# Patient Record
Sex: FEMALE | Race: WHITE | NOT HISPANIC OR LATINO | Employment: UNEMPLOYED | URBAN - METROPOLITAN AREA
[De-identification: names, ages, dates, MRNs, and addresses within clinical notes are randomized per-mention and may not be internally consistent; named-entity substitution may affect disease eponyms.]

---

## 2017-03-01 ENCOUNTER — OFFICE VISIT (OUTPATIENT)
Dept: RETAIL CLINIC | Facility: CLINIC | Age: 32
End: 2017-03-01

## 2017-03-01 VITALS
DIASTOLIC BLOOD PRESSURE: 72 MMHG | HEART RATE: 85 BPM | OXYGEN SATURATION: 97 % | HEIGHT: 64 IN | TEMPERATURE: 98.3 F | BODY MASS INDEX: 40.97 KG/M2 | SYSTOLIC BLOOD PRESSURE: 112 MMHG | WEIGHT: 240 LBS

## 2017-03-01 DIAGNOSIS — J01.00 ACUTE MAXILLARY SINUSITIS, RECURRENCE NOT SPECIFIED: Primary | ICD-10-CM

## 2017-03-01 PROCEDURE — 99213 OFFICE O/P EST LOW 20 MIN: CPT | Performed by: NURSE PRACTITIONER

## 2017-03-01 RX ORDER — AMOXICILLIN 500 MG/1
1000 CAPSULE ORAL 2 TIMES DAILY
Qty: 40 CAPSULE | Refills: 0 | Status: SHIPPED | OUTPATIENT
Start: 2017-03-01 | End: 2017-10-02 | Stop reason: HOSPADM

## 2017-03-01 NOTE — PROGRESS NOTES
Subjective   Temitope Moreno is a 31 y.o. female.     HPI Comments: 2 week history stuffy, runny nose--at first clear nasal d/c, now green; moderate coughing; c/o severe pain in right maxillary area; denies ear pain, fever; c/o mild nausea this morning, but she is also 9 wks 3 d pregnant.    Has taken Zyrtec and Tylenol along with her prenatal vitamins.  Has used warm compresses on face with minimal relief;        The following portions of the patient's history were reviewed and updated as appropriate: allergies, current medications, past family history, past medical history, past social history, past surgical history and problem list.    Review of Systems   Constitutional: Positive for fatigue. Negative for appetite change, chills and fever.   HENT: Positive for congestion, postnasal drip, rhinorrhea and sinus pressure. Negative for ear pain and sore throat.    Respiratory: Positive for cough.    Skin: Negative for rash.   Neurological: Negative for dizziness and light-headedness.       Objective   Physical Exam   Constitutional: She is oriented to person, place, and time. She appears well-developed and well-nourished.   HENT:   Head: Normocephalic and atraumatic.   Right Ear: Tympanic membrane is not erythematous and not bulging. A middle ear effusion is present.   Left Ear: Tympanic membrane is not erythematous and not bulging. A middle ear effusion (mild clear, light reflex present) is present.   Nose: Mucosal edema present. Right sinus exhibits maxillary sinus tenderness (severe tenderness).   Mouth/Throat: Uvula is midline. Posterior oropharyngeal erythema (mild with PND visible) present.   Cardiovascular: Normal rate, regular rhythm and normal heart sounds.    Pulmonary/Chest: Effort normal and breath sounds normal.   Lymphadenopathy:     She has cervical adenopathy (mild nontender bilat. ant. cervical).   Neurological: She is alert and oriented to person, place, and time.   Skin: Skin is warm, dry and intact.        Assessment/Plan   Temitope was seen today for sinusitis.    Diagnoses and all orders for this visit:    Acute maxillary sinusitis, recurrence not specified  -     amoxicillin (AMOXIL) 500 MG capsule; Take 2 capsules by mouth 2 (Two) Times a Day.

## 2017-03-01 NOTE — PATIENT INSTRUCTIONS

## 2017-09-06 ENCOUNTER — LAB REQUISITION (OUTPATIENT)
Dept: LAB | Facility: HOSPITAL | Age: 32
End: 2017-09-06

## 2017-09-06 DIAGNOSIS — Z34.03 ENCOUNTER FOR SUPERVISION OF NORMAL FIRST PREGNANCY IN THIRD TRIMESTER: ICD-10-CM

## 2017-09-06 PROCEDURE — 87081 CULTURE SCREEN ONLY: CPT | Performed by: OBSTETRICS & GYNECOLOGY

## 2017-09-09 LAB — BACTERIA SPEC AEROBE CULT: NORMAL

## 2017-09-30 ENCOUNTER — HOSPITAL ENCOUNTER (INPATIENT)
Facility: HOSPITAL | Age: 32
LOS: 2 days | Discharge: HOME | End: 2017-10-02
Attending: OBSTETRICS & GYNECOLOGY | Admitting: OBSTETRICS & GYNECOLOGY

## 2017-09-30 PROBLEM — Z34.90 PREGNANCY: Status: ACTIVE | Noted: 2017-09-30

## 2017-09-30 LAB
ABO GROUP BLD: NORMAL
AMPHET+METHAMPHET UR QL: NEGATIVE
AMPHETAMINES UR QL: NEGATIVE
BARBITURATES UR QL SCN: NEGATIVE
BENZODIAZ UR QL SCN: NEGATIVE
BLD GP AB SCN SERPL QL: NEGATIVE
BUPRENORPHINE SERPL-MCNC: NEGATIVE NG/ML
CANNABINOIDS SERPL QL: NEGATIVE
COCAINE UR QL: NEGATIVE
DEPRECATED RDW RBC AUTO: 44 FL (ref 37–54)
ERYTHROCYTE [DISTWIDTH] IN BLOOD BY AUTOMATED COUNT: 13.5 % (ref 11.3–14.5)
HCT VFR BLD AUTO: 41.2 % (ref 34.5–44)
HGB BLD-MCNC: 14.3 G/DL (ref 11.5–15.5)
MCH RBC QN AUTO: 30.8 PG (ref 27–31)
MCHC RBC AUTO-ENTMCNC: 34.7 G/DL (ref 32–36)
MCV RBC AUTO: 88.8 FL (ref 80–99)
METHADONE UR QL SCN: NEGATIVE
OPIATES UR QL: NEGATIVE
OXYCODONE UR QL SCN: NEGATIVE
PCP UR QL SCN: NEGATIVE
PLATELET # BLD AUTO: 234 10*3/MM3 (ref 150–450)
PMV BLD AUTO: 10.4 FL (ref 6–12)
PROPOXYPH UR QL: NEGATIVE
RBC # BLD AUTO: 4.64 10*6/MM3 (ref 3.89–5.14)
RH BLD: POSITIVE
TRICYCLICS UR QL SCN: NEGATIVE
WBC NRBC COR # BLD: 11.7 10*3/MM3 (ref 3.5–10.8)

## 2017-09-30 PROCEDURE — 36415 COLL VENOUS BLD VENIPUNCTURE: CPT | Performed by: OBSTETRICS & GYNECOLOGY

## 2017-09-30 PROCEDURE — 86901 BLOOD TYPING SEROLOGIC RH(D): CPT | Performed by: OBSTETRICS & GYNECOLOGY

## 2017-09-30 PROCEDURE — 85027 COMPLETE CBC AUTOMATED: CPT | Performed by: OBSTETRICS & GYNECOLOGY

## 2017-09-30 PROCEDURE — 25010000002 ONDANSETRON PER 1 MG: Performed by: OBSTETRICS & GYNECOLOGY

## 2017-09-30 PROCEDURE — 86850 RBC ANTIBODY SCREEN: CPT | Performed by: OBSTETRICS & GYNECOLOGY

## 2017-09-30 PROCEDURE — 86900 BLOOD TYPING SEROLOGIC ABO: CPT | Performed by: OBSTETRICS & GYNECOLOGY

## 2017-09-30 PROCEDURE — 80306 DRUG TEST PRSMV INSTRMNT: CPT | Performed by: OBSTETRICS & GYNECOLOGY

## 2017-09-30 PROCEDURE — 0KQM0ZZ REPAIR PERINEUM MUSCLE, OPEN APPROACH: ICD-10-PCS | Performed by: OBSTETRICS & GYNECOLOGY

## 2017-09-30 RX ORDER — CARBOPROST TROMETHAMINE 250 UG/ML
250 INJECTION, SOLUTION INTRAMUSCULAR AS NEEDED
Status: DISCONTINUED | OUTPATIENT
Start: 2017-09-30 | End: 2017-09-30 | Stop reason: HOSPADM

## 2017-09-30 RX ORDER — BISACODYL 10 MG
10 SUPPOSITORY, RECTAL RECTAL DAILY PRN
Status: DISCONTINUED | OUTPATIENT
Start: 2017-10-01 | End: 2017-10-02 | Stop reason: HOSPADM

## 2017-09-30 RX ORDER — PROMETHAZINE HYDROCHLORIDE 25 MG/1
25 TABLET ORAL EVERY 6 HOURS PRN
Status: DISCONTINUED | OUTPATIENT
Start: 2017-09-30 | End: 2017-10-02 | Stop reason: HOSPADM

## 2017-09-30 RX ORDER — SODIUM CHLORIDE 0.9 % (FLUSH) 0.9 %
1-10 SYRINGE (ML) INJECTION AS NEEDED
Status: DISCONTINUED | OUTPATIENT
Start: 2017-09-30 | End: 2017-09-30 | Stop reason: HOSPADM

## 2017-09-30 RX ORDER — ONDANSETRON 2 MG/ML
4 INJECTION INTRAMUSCULAR; INTRAVENOUS EVERY 6 HOURS PRN
Status: DISCONTINUED | OUTPATIENT
Start: 2017-09-30 | End: 2017-10-02 | Stop reason: HOSPADM

## 2017-09-30 RX ORDER — LANOLIN 100 %
OINTMENT (GRAM) TOPICAL AS NEEDED
Status: DISCONTINUED | OUTPATIENT
Start: 2017-09-30 | End: 2017-10-02 | Stop reason: HOSPADM

## 2017-09-30 RX ORDER — SERTRALINE HYDROCHLORIDE 25 MG/1
25 TABLET, FILM COATED ORAL DAILY
COMMUNITY
End: 2020-10-12 | Stop reason: SDUPTHER

## 2017-09-30 RX ORDER — MISOPROSTOL 200 UG/1
800 TABLET ORAL AS NEEDED
Status: DISCONTINUED | OUTPATIENT
Start: 2017-09-30 | End: 2017-09-30 | Stop reason: HOSPADM

## 2017-09-30 RX ORDER — OXYTOCIN/RINGER'S LACTATE 20/1000 ML
125 PLASTIC BAG, INJECTION (ML) INTRAVENOUS CONTINUOUS PRN
Status: DISCONTINUED | OUTPATIENT
Start: 2017-09-30 | End: 2017-09-30 | Stop reason: HOSPADM

## 2017-09-30 RX ORDER — SERTRALINE HYDROCHLORIDE 25 MG/1
25 TABLET, FILM COATED ORAL NIGHTLY
Status: DISCONTINUED | OUTPATIENT
Start: 2017-09-30 | End: 2017-10-02 | Stop reason: HOSPADM

## 2017-09-30 RX ORDER — IBUPROFEN 600 MG/1
600 TABLET ORAL EVERY 6 HOURS PRN
Status: DISCONTINUED | OUTPATIENT
Start: 2017-09-30 | End: 2017-10-02 | Stop reason: HOSPADM

## 2017-09-30 RX ORDER — ZOLPIDEM TARTRATE 5 MG/1
5 TABLET ORAL NIGHTLY PRN
Status: DISCONTINUED | OUTPATIENT
Start: 2017-09-30 | End: 2017-10-02 | Stop reason: HOSPADM

## 2017-09-30 RX ORDER — CETIRIZINE HYDROCHLORIDE 5 MG/1
5 TABLET ORAL DAILY
COMMUNITY
End: 2022-09-18

## 2017-09-30 RX ORDER — DOCUSATE SODIUM 100 MG/1
100 CAPSULE, LIQUID FILLED ORAL 2 TIMES DAILY
Status: DISCONTINUED | OUTPATIENT
Start: 2017-09-30 | End: 2017-10-02 | Stop reason: HOSPADM

## 2017-09-30 RX ORDER — DOCUSATE SODIUM 100 MG/1
100 CAPSULE, LIQUID FILLED ORAL 2 TIMES DAILY
COMMUNITY
End: 2017-10-02 | Stop reason: HOSPADM

## 2017-09-30 RX ORDER — SODIUM CHLORIDE, SODIUM LACTATE, POTASSIUM CHLORIDE, CALCIUM CHLORIDE 600; 310; 30; 20 MG/100ML; MG/100ML; MG/100ML; MG/100ML
125 INJECTION, SOLUTION INTRAVENOUS CONTINUOUS
Status: DISCONTINUED | OUTPATIENT
Start: 2017-09-30 | End: 2017-10-02 | Stop reason: HOSPADM

## 2017-09-30 RX ORDER — HYDROCODONE BITARTRATE AND ACETAMINOPHEN 5; 325 MG/1; MG/1
1 TABLET ORAL EVERY 4 HOURS PRN
Status: DISCONTINUED | OUTPATIENT
Start: 2017-09-30 | End: 2017-10-02 | Stop reason: HOSPADM

## 2017-09-30 RX ORDER — METHYLERGONOVINE MALEATE 0.2 MG/ML
200 INJECTION INTRAVENOUS ONCE AS NEEDED
Status: DISCONTINUED | OUTPATIENT
Start: 2017-09-30 | End: 2017-09-30 | Stop reason: HOSPADM

## 2017-09-30 RX ORDER — OXYTOCIN/RINGER'S LACTATE 20/1000 ML
999 PLASTIC BAG, INJECTION (ML) INTRAVENOUS ONCE
Status: COMPLETED | OUTPATIENT
Start: 2017-09-30 | End: 2017-09-30

## 2017-09-30 RX ADMIN — WITCH HAZEL 1 PAD: 500 SOLUTION RECTAL; TOPICAL at 06:44

## 2017-09-30 RX ADMIN — Medication: at 06:43

## 2017-09-30 RX ADMIN — Medication 125 ML/HR: at 04:37

## 2017-09-30 RX ADMIN — BENZOCAINE AND MENTHOL: 20; .5 SPRAY TOPICAL at 06:43

## 2017-09-30 RX ADMIN — Medication 999 ML/HR: at 03:37

## 2017-09-30 RX ADMIN — ONDANSETRON 4 MG: 2 INJECTION INTRAMUSCULAR; INTRAVENOUS at 02:21

## 2017-09-30 RX ADMIN — IBUPROFEN 600 MG: 600 TABLET, FILM COATED ORAL at 05:50

## 2017-09-30 RX ADMIN — IBUPROFEN 600 MG: 600 TABLET, FILM COATED ORAL at 18:03

## 2017-09-30 RX ADMIN — IBUPROFEN 600 MG: 600 TABLET, FILM COATED ORAL at 23:56

## 2017-09-30 RX ADMIN — IBUPROFEN 600 MG: 600 TABLET, FILM COATED ORAL at 12:19

## 2017-09-30 RX ADMIN — MEASLES, MUMPS, AND RUBELLA VIRUS VACCINE LIVE 0.5 ML: 1000; 12500; 1000 INJECTION, POWDER, LYOPHILIZED, FOR SUSPENSION SUBCUTANEOUS at 05:50

## 2017-09-30 RX ADMIN — SERTRALINE HYDROCHLORIDE 25 MG: 25 TABLET ORAL at 21:09

## 2017-09-30 RX ADMIN — DOCUSATE SODIUM 100 MG: 100 CAPSULE, LIQUID FILLED ORAL at 18:03

## 2017-10-01 LAB
BASOPHILS # BLD AUTO: 0.03 10*3/MM3 (ref 0–0.2)
BASOPHILS NFR BLD AUTO: 0.3 % (ref 0–1)
DEPRECATED RDW RBC AUTO: 46.6 FL (ref 37–54)
EOSINOPHIL # BLD AUTO: 0.17 10*3/MM3 (ref 0–0.3)
EOSINOPHIL NFR BLD AUTO: 1.6 % (ref 0–3)
ERYTHROCYTE [DISTWIDTH] IN BLOOD BY AUTOMATED COUNT: 13.9 % (ref 11.3–14.5)
HCT VFR BLD AUTO: 38 % (ref 34.5–44)
HGB BLD-MCNC: 12.5 G/DL (ref 11.5–15.5)
IMM GRANULOCYTES # BLD: 0.03 10*3/MM3 (ref 0–0.03)
IMM GRANULOCYTES NFR BLD: 0.3 % (ref 0–0.6)
LYMPHOCYTES # BLD AUTO: 3.01 10*3/MM3 (ref 0.6–4.8)
LYMPHOCYTES NFR BLD AUTO: 28.2 % (ref 24–44)
MCH RBC QN AUTO: 30.5 PG (ref 27–31)
MCHC RBC AUTO-ENTMCNC: 32.9 G/DL (ref 32–36)
MCV RBC AUTO: 92.7 FL (ref 80–99)
MONOCYTES # BLD AUTO: 0.81 10*3/MM3 (ref 0–1)
MONOCYTES NFR BLD AUTO: 7.6 % (ref 0–12)
NEUTROPHILS # BLD AUTO: 6.62 10*3/MM3 (ref 1.5–8.3)
NEUTROPHILS NFR BLD AUTO: 62 % (ref 41–71)
PLATELET # BLD AUTO: 206 10*3/MM3 (ref 150–450)
PMV BLD AUTO: 10.7 FL (ref 6–12)
RBC # BLD AUTO: 4.1 10*6/MM3 (ref 3.89–5.14)
WBC NRBC COR # BLD: 10.67 10*3/MM3 (ref 3.5–10.8)

## 2017-10-01 PROCEDURE — 85025 COMPLETE CBC W/AUTO DIFF WBC: CPT | Performed by: OBSTETRICS & GYNECOLOGY

## 2017-10-01 RX ADMIN — IBUPROFEN 600 MG: 600 TABLET, FILM COATED ORAL at 13:58

## 2017-10-01 RX ADMIN — DOCUSATE SODIUM 100 MG: 100 CAPSULE, LIQUID FILLED ORAL at 17:42

## 2017-10-01 RX ADMIN — IBUPROFEN 600 MG: 600 TABLET, FILM COATED ORAL at 05:51

## 2017-10-01 RX ADMIN — SERTRALINE HYDROCHLORIDE 25 MG: 25 TABLET ORAL at 22:59

## 2017-10-01 RX ADMIN — DOCUSATE SODIUM 100 MG: 100 CAPSULE, LIQUID FILLED ORAL at 07:54

## 2017-10-01 NOTE — PROGRESS NOTES
10/1/2017  PPD #1    Subjective   Temitope feels well.  Patient describes her lochia less than menses.  Pain is well controlled       Objective   Temp: Temp:  [97.8 °F (36.6 °C)-98.1 °F (36.7 °C)] 97.8 °F (36.6 °C) Temp src: Oral   BP: BP: (110-137)/(65-77) 135/77        Pulse: Heart Rate:  [63-68] 63  RR: Resp:  [16-18] 18    General:  No acute distress   Abdomen: Fundus firm and beneath umbilicus   Pelvis: deferred     Lab Results   Component Value Date    WBC 10.67 10/01/2017    HGB 12.5 10/01/2017    HCT 38.0 10/01/2017    MCV 92.7 10/01/2017     10/01/2017    ABORH AB Rh Positive 01/20/2015    RUBELLAIGGIN Immune 06/11/2014    HEPBSAG NonReactive 06/11/2014       Assessment  1. PPD# 1 after vaginal delivery    Plan  1. Routine postpartum care.  2.   Desires circ.    This note has been electronically signed.    Carly Garcia MD  October 1, 2017

## 2017-10-01 NOTE — PLAN OF CARE
Problem: Patient Care Overview (Adult)  Goal: Plan of Care Review  Outcome: Ongoing (interventions implemented as appropriate)    10/01/17 0507   Coping/Psychosocial Response Interventions   Plan Of Care Reviewed With patient   Patient Care Overview   Progress improving       Goal: Adult Individualization and Mutuality  Outcome: Ongoing (interventions implemented as appropriate)  Goal: Discharge Needs Assessment  Outcome: Ongoing (interventions implemented as appropriate)    Problem: Breastfeeding (Adult,NICU,,Obstetrics,Pediatric)  Goal: Signs and Symptoms of Listed Potential Problems Will be Absent or Manageable (Breastfeeding)  Outcome: Ongoing (interventions implemented as appropriate)    10/01/17 0507   Breastfeeding   Problems Assessed (Breastfeeding) all   Problems Present (Breastfeeding) none

## 2017-10-02 VITALS
TEMPERATURE: 98.1 F | RESPIRATION RATE: 18 BRPM | WEIGHT: 267 LBS | HEART RATE: 74 BPM | BODY MASS INDEX: 45.58 KG/M2 | SYSTOLIC BLOOD PRESSURE: 127 MMHG | DIASTOLIC BLOOD PRESSURE: 70 MMHG | HEIGHT: 64 IN

## 2017-10-02 RX ORDER — IBUPROFEN 600 MG/1
600 TABLET ORAL EVERY 6 HOURS PRN
Qty: 30 TABLET | Refills: 0 | Status: SHIPPED | OUTPATIENT
Start: 2017-10-02 | End: 2020-10-12

## 2017-10-02 RX ADMIN — WITCH HAZEL 1 PAD: 500 SOLUTION RECTAL; TOPICAL at 03:25

## 2017-10-02 RX ADMIN — IBUPROFEN 600 MG: 600 TABLET, FILM COATED ORAL at 03:25

## 2017-10-02 NOTE — DISCHARGE SUMMARY
Discharge Summary    Date of Admission: 2017  Date of Discharge:  10/2/2017      Patient: Temitope Moreno      MR#:[id number]    Delivery Provider: Carly Garcia     Discharge Surgeon/OB: Angy Forde    Presenting Problem/History of Present Illness  Pregnancy [Z34.90]     Patient Active Problem List   Diagnosis   (none) - all problems resolved or deleted         Discharge Diagnosis: Vaginal delivery at 40w0d    Procedures:  Vaginal, Spontaneous Delivery     2017    3:31 AM        Discharge Date: 10/2/2017;     Hospital Course  Patient is a 31 y.o. female  at 40w0d status post vaginal delivery without complication. Postpartum the patient did well. She remained afebrile, with vital signs stable. She was ready for discharge on postpartum day 2.     Infant:   male  fetus 8 lb 4.8 oz (3.765 kg)  with Apgar scores of 8  , 8   at five minutes.    Condition on Discharge:  Stable    Vital Signs  Temp:  [97.6 °F (36.4 °C)-97.9 °F (36.6 °C)] 97.6 °F (36.4 °C)  Heart Rate:  [63-68] 63  Resp:  [16-18] 18  BP: (119-130)/(74) 130/74    Lab Results   Component Value Date    WBC 10.67 10/01/2017    HGB 12.5 10/01/2017    HCT 38.0 10/01/2017    MCV 92.7 10/01/2017     10/01/2017       Discharge Disposition  Home or Self Care    Discharge Medications   Temitope Moreno   Home Medication Instructions LANE:[id number]    Printed on:10/02/17 0949   Medication Information                      cetirizine (zyrTEC) 5 MG tablet  Take 5 mg by mouth Daily.             ibuprofen (ADVIL,MOTRIN) 600 MG tablet  Take 1 tablet by mouth Every 6 (Six) Hours As Needed for Mild Pain .             Prenatal Multivit-Min-Fe-FA (PRENATAL VITAMINS PO)  Take  by mouth.             sertraline (ZOLOFT) 25 MG tablet  Take 25 mg by mouth Daily.                 Discharge Diet:     Activity at Discharge:   Activity Instructions     Discharge Activity Restrictions       1) No driving for restrictions.   2) Return to school / work in 6 to 8  weeks}.  3) May shower.  4) Do not lift / push / pull more then 15 lbs.                 Follow-up Appointments  Future Appointments  Date Time Provider Department Center   10/12/2017 7:00 AM [facility] LD INDUCTION ROOM 2 [facility]     Additional Instructions for the Follow-ups that You Need to Schedule     Call MD With Problems / Concerns    As directed        Discharge Follow-Up With Specified Provider    As directed    To:  Appointment with Dr. Forde   Follow Up:  6 Weeks       Discharge Follow-up with Specified Provider    As directed    To:  Appointment with Dr. Forde   Follow Up:  6 Weeks                 AMRILYN Dominguez  10/02/17  9:49 AM  Cs[facility]

## 2017-10-12 ENCOUNTER — HOSPITAL ENCOUNTER (OUTPATIENT)
Dept: LABOR AND DELIVERY | Facility: HOSPITAL | Age: 32
Discharge: HOME | End: 2017-10-12

## 2020-07-14 LAB
EXTERNAL CHLAMYDIA SCREEN: NORMAL
EXTERNAL GONORRHEA SCREEN: NORMAL
EXTERNAL HEPATITIS B SURFACE ANTIGEN: NEGATIVE
EXTERNAL HEPATITIS C AB: NORMAL
EXTERNAL RUBELLA QUALITATIVE: NORMAL
EXTERNAL VDRL: NORMAL

## 2020-09-01 ENCOUNTER — TELEPHONE (OUTPATIENT)
Dept: OBSTETRICS AND GYNECOLOGY | Facility: CLINIC | Age: 35
End: 2020-09-01

## 2020-09-01 NOTE — TELEPHONE ENCOUNTER
PT CALLED BACK STATING HER CHILD HAS PIN WORMS SO THE DR GAVE THE WHOLE FAMILY MEDICINE AND PT WANTS TO KNOW IF IT IS SAFE FOR HER TO TAKE. PT IS 15 WEEKS PREG.   RECESS PIN WORM MEDICINE IS THE MEDICATION. PLEASE ADVISE THANKS

## 2020-09-02 NOTE — TELEPHONE ENCOUNTER
SOL Lyons already contacted patient and informed her of medication safety yesterday.   [Normal] : no mass and no adenopathy [de-identified] : right neck incison well approximated, no swelling, erythema, nor drainage noted

## 2020-09-03 PROBLEM — Z34.90 PREGNANCY: Status: ACTIVE | Noted: 2020-09-03

## 2020-09-03 PROBLEM — F41.9 ANXIETY: Status: ACTIVE | Noted: 2020-09-03

## 2020-09-03 PROBLEM — Z80.0 FAMILY HISTORY OF PANCREATIC CANCER: Status: ACTIVE | Noted: 2020-09-03

## 2020-09-03 PROBLEM — O09.529 AMA (ADVANCED MATERNAL AGE) MULTIGRAVIDA 35+: Status: ACTIVE | Noted: 2020-09-03

## 2020-09-03 PROBLEM — E66.9 OBESITY (BMI 30-39.9): Status: ACTIVE | Noted: 2020-09-03

## 2020-09-08 ENCOUNTER — ROUTINE PRENATAL (OUTPATIENT)
Dept: OBSTETRICS AND GYNECOLOGY | Facility: CLINIC | Age: 35
End: 2020-09-08

## 2020-09-08 VITALS — BODY MASS INDEX: 41.2 KG/M2 | DIASTOLIC BLOOD PRESSURE: 64 MMHG | SYSTOLIC BLOOD PRESSURE: 100 MMHG | WEIGHT: 240 LBS

## 2020-09-08 DIAGNOSIS — O09.529 ANTEPARTUM MULTIGRAVIDA OF ADVANCED MATERNAL AGE: ICD-10-CM

## 2020-09-08 DIAGNOSIS — Z34.90 PREGNANCY, UNSPECIFIED GESTATIONAL AGE: Primary | ICD-10-CM

## 2020-09-08 DIAGNOSIS — F41.9 ANXIETY: ICD-10-CM

## 2020-09-08 LAB
GLUCOSE UR STRIP-MCNC: NEGATIVE MG/DL
PROT UR STRIP-MCNC: NEGATIVE MG/DL

## 2020-09-08 PROCEDURE — 0502F SUBSEQUENT PRENATAL CARE: CPT | Performed by: OBSTETRICS & GYNECOLOGY

## 2020-09-08 NOTE — PROGRESS NOTES
OB FOLLOW UP    Temitope Moreno is a 34 y.o.  15w6d patient being seen today for her obstetrical follow up visit. Patient reports no complaints.  Declines AFP    Her prenatal care is complicated by (and status) :    Patient Active Problem List   Diagnosis   • Obesity (BMI 30-39.9)   • Pregnancy   • Family history of pancreatic cancer   • AMA (advanced maternal age) multigravida 35+   • Spontaneous vaginal delivery   • Anxiety       ROS -   Patient Reports : No Problems  Patient Denies: Loss of Fluid, Vaginal Spotting, Vision Changes, Headaches and Cramping/Contractions   Fetal Movement : normal      /64   Wt 109 kg (240 lb)   LMP 2020 (Exact Date)   BMI 41.20 kg/m²     Ultrasound Today: no    EXAM:  Vitals: See prenatal flowsheet   Abdomen: See prenatal flowsheet   Urine glucose/protein: See prenatal flowsheet   Pelvic: See prenatal flowsheet     Assessment    1. Pregnancy at 15w6d  2. Fetal status reassuring     Problem List Items Addressed This Visit        Other    Pregnancy - Primary    Relevant Orders    POC Urinalysis Dipstick (Completed)    AMA (advanced maternal age) multigravida 35+    Overview     [place] drawn at 11 weeks 6 days but quantity not sufficient.  Patient did not redraw.           Anxiety    Overview     Stopped Zoloft with positive pregnancy test.               Plan    1. Plan for anatomy ultrasound next visit.  Patient desires surprise gender.    Angy Forde MD  2020

## 2020-10-12 ENCOUNTER — ROUTINE PRENATAL (OUTPATIENT)
Dept: OBSTETRICS AND GYNECOLOGY | Facility: CLINIC | Age: 35
End: 2020-10-12

## 2020-10-12 VITALS — WEIGHT: 245 LBS | DIASTOLIC BLOOD PRESSURE: 60 MMHG | BODY MASS INDEX: 42.05 KG/M2 | SYSTOLIC BLOOD PRESSURE: 112 MMHG

## 2020-10-12 DIAGNOSIS — O09.529 ANTEPARTUM MULTIGRAVIDA OF ADVANCED MATERNAL AGE: ICD-10-CM

## 2020-10-12 DIAGNOSIS — E66.9 OBESITY (BMI 30-39.9): ICD-10-CM

## 2020-10-12 DIAGNOSIS — F41.9 ANXIETY: ICD-10-CM

## 2020-10-12 DIAGNOSIS — Z3A.20 20 WEEKS GESTATION OF PREGNANCY: Primary | ICD-10-CM

## 2020-10-12 LAB
GLUCOSE UR STRIP-MCNC: NEGATIVE MG/DL
PROT UR STRIP-MCNC: NEGATIVE MG/DL

## 2020-10-12 PROCEDURE — 90686 IIV4 VACC NO PRSV 0.5 ML IM: CPT | Performed by: OBSTETRICS & GYNECOLOGY

## 2020-10-12 PROCEDURE — 99213 OFFICE O/P EST LOW 20 MIN: CPT | Performed by: OBSTETRICS & GYNECOLOGY

## 2020-10-12 PROCEDURE — 90471 IMMUNIZATION ADMIN: CPT | Performed by: OBSTETRICS & GYNECOLOGY

## 2020-10-12 NOTE — PROGRESS NOTES
OB FOLLOW UP  CC- Here for care of pregnancy        Temitope Moreno is a 34 y.o.  20w5d patient being seen today for her obstetrical follow up visit. Patient reports varicose veins in L leg.     Patient stopped zoloft 50mg with +upt asking to restart today.     Her prenatal care is complicated by (and status) :    Patient Active Problem List   Diagnosis   • Obesity (BMI 30-39.9)   • Pregnancy   • Family history of pancreatic cancer   • AMA (advanced maternal age) multigravida 35+   • Spontaneous vaginal delivery   • Anxiety       Flu Status: will give in office today.    The additional following portions of the patient's history were reviewed and updated as appropriate: allergies, current medications, past family history, past medical history, past social history, past surgical history and problem list.    ROS -   Patient Reports : varicosities in L leg  Patient Denies: Vaginal Spotting and Cramping/Contractions   Fetal Movement : normal  All other systems reviewed and are negative.     I have reviewed and agree with the HPI, ROS, and historical information as entered above. Angy Forde MD    /60   Wt 111 kg (245 lb)   LMP 2020 (Exact Date)   BMI 42.05 kg/m²     Ultrasound Today: yes    EXAM:   Vitals: See prenatal flowsheet   Abdomen: See prenatal flowsheet   Urine glucose/protein: See prenatal flowsheet   Pelvic: See prenatal flowsheet   HRT: See prenatal flowsheet   Presentation: See prenatal flowsheet   Movement: See prenatal flowsheet          Assessment and Plan    Problem List Items Addressed This Visit        Digestive    Obesity (BMI 30-39.9)       Other    Pregnancy - Primary    Relevant Orders    POC Urinalysis Dipstick (Completed)    US Ob Limited 1 + Fetuses    AMA (advanced maternal age) multigravida 35+    Overview     [place] drawn at 11 weeks 6 days but quantity not sufficient.  Patient did not redraw.           Spontaneous vaginal delivery    Overview     X2.    baby girl Judie weighing 6 pounds 8 ounces.  2017 baby boy Marcus         Anxiety    Overview     Stopped Zoloft with positive pregnancy test.               1. Pregnancy at 20w5d.  Limited views of outflow tracts today will re-eval in 4 weeks.  2. Fetal status reassuring.   3. Activity and Exercise discussed.  Return in about 4 weeks (around 11/9/2020) for ultrasound.    Angy Forde MD  10/12/2020

## 2020-11-11 ENCOUNTER — ROUTINE PRENATAL (OUTPATIENT)
Dept: OBSTETRICS AND GYNECOLOGY | Facility: CLINIC | Age: 35
End: 2020-11-11

## 2020-11-11 VITALS — BODY MASS INDEX: 42.05 KG/M2 | SYSTOLIC BLOOD PRESSURE: 110 MMHG | DIASTOLIC BLOOD PRESSURE: 64 MMHG | WEIGHT: 245 LBS

## 2020-11-11 DIAGNOSIS — E66.9 OBESITY (BMI 30-39.9): ICD-10-CM

## 2020-11-11 DIAGNOSIS — F41.9 ANXIETY: ICD-10-CM

## 2020-11-11 DIAGNOSIS — O09.529 ANTEPARTUM MULTIGRAVIDA OF ADVANCED MATERNAL AGE: ICD-10-CM

## 2020-11-11 DIAGNOSIS — Z3A.24 24 WEEKS GESTATION OF PREGNANCY: Primary | ICD-10-CM

## 2020-11-11 LAB
GLUCOSE UR STRIP-MCNC: NEGATIVE MG/DL
PROT UR STRIP-MCNC: NEGATIVE MG/DL

## 2020-11-11 PROCEDURE — 99213 OFFICE O/P EST LOW 20 MIN: CPT | Performed by: OBSTETRICS & GYNECOLOGY

## 2020-11-11 NOTE — PROGRESS NOTES
OB FOLLOW UP  CC- Here for care of pregnancy        Temitope Moreno is a 35 y.o.  25w0d patient being seen today for her obstetrical follow up visit. Patient reports no complaints.     Her prenatal care is complicated by (and status) :    Patient Active Problem List   Diagnosis   • Obesity (BMI 30-39.9)   • Pregnancy   • Family history of pancreatic cancer   • AMA (advanced maternal age) multigravida 35+   • Spontaneous vaginal delivery   • Anxiety       Flu Status: Already given in current flu season  Ultrasound Today: Yes.  Findings showed normal fetal growth and heart outflow tracts. .  I have personally evaluated the U/S and agree with the findings. Angy Forde MD    ROS -   Patient Reports : No Problems  Patient Denies: Loss of Fluid, Vaginal Spotting, Vision Changes, Headaches and Contractions  Fetal Movement : normal  All other systems reviewed and are negative.       The additional following portions of the patient's history were reviewed and updated as appropriate: allergies, current medications, past family history, past medical history, past social history, past surgical history and problem list.    I have reviewed and agree with the HPI, ROS, and historical information as entered above. Angy Forde MD    /64   Wt 111 kg (245 lb)   LMP 2020 (Exact Date)   BMI 42.05 kg/m²       EXAM:     FHT: 145 BPM   Size consistent with dates on ultrasound.  Pelvic Exam: No    Urine glucose/protein: See prenatal flowsheet       Assessment and Plan    Problem List Items Addressed This Visit        Digestive    Obesity (BMI 30-39.9)       Other    Pregnancy - Primary    Relevant Orders    POC Urinalysis Dipstick (Completed)    AMA (advanced maternal age) multigravida 35+    Overview     [place] drawn at 11 weeks 6 days but quantity not sufficient.  Patient did not redraw.           Spontaneous vaginal delivery    Overview     X2.  2015 baby girl Judie weighing 6 pounds 8 ounces.   2017 baby boy Marcus         Anxiety    Overview     Stopped Zoloft with positive pregnancy test.         Relevant Medications    sertraline (Zoloft) 50 MG tablet          1. Pregnancy at 25w0d  2. Fetal status reassuring.   3. Activity and Exercise discussed.  Return in about 3 weeks (around 12/2/2020) for glucola.    Angy Forde MD  11/11/2020

## 2020-12-08 ENCOUNTER — ROUTINE PRENATAL (OUTPATIENT)
Dept: OBSTETRICS AND GYNECOLOGY | Facility: CLINIC | Age: 35
End: 2020-12-08

## 2020-12-08 VITALS — WEIGHT: 251 LBS | SYSTOLIC BLOOD PRESSURE: 110 MMHG | BODY MASS INDEX: 43.08 KG/M2 | DIASTOLIC BLOOD PRESSURE: 70 MMHG

## 2020-12-08 DIAGNOSIS — E66.9 OBESITY (BMI 30-39.9): ICD-10-CM

## 2020-12-08 DIAGNOSIS — Z3A.28 28 WEEKS GESTATION OF PREGNANCY: Primary | ICD-10-CM

## 2020-12-08 DIAGNOSIS — O09.529 ANTEPARTUM MULTIGRAVIDA OF ADVANCED MATERNAL AGE: ICD-10-CM

## 2020-12-08 DIAGNOSIS — F41.9 ANXIETY: ICD-10-CM

## 2020-12-08 PROCEDURE — 99213 OFFICE O/P EST LOW 20 MIN: CPT | Performed by: OBSTETRICS & GYNECOLOGY

## 2020-12-08 RX ORDER — BREAST PUMP
EACH MISCELLANEOUS
Qty: 1 EACH | Refills: 0 | Status: SHIPPED | OUTPATIENT
Start: 2020-12-08 | End: 2021-09-09

## 2020-12-08 NOTE — PROGRESS NOTES
OB FOLLOW UP  CC- Here for care of pregnancy        Temitope Moreno is a 35 y.o.  28w6d patient being seen today for her obstetrical follow up. Patient reports no complaints.     Patient undergoing Glucola testing today. She is due for her testing at 0915.     MBT: AB+  Rhogam Given: N/A  TDAP: next visit, out of stock today.  Breast Pump: prescription given  Flu Status: Already given in current flu season  Ultrasound Today: No.    Her prenatal care is complicated by (and status) :    Patient Active Problem List   Diagnosis   • Obesity (BMI 30-39.9)   • Pregnancy   • Family history of pancreatic cancer   • AMA (advanced maternal age) multigravida 35+   • Spontaneous vaginal delivery   • Anxiety         ROS -   Patient Reports : No Problems  Patient Denies: Loss of Fluid, Vaginal Spotting, Vision Changes, Headaches, Nausea , Vomiting  and Contractions  Fetal Movement : normal    The additional following portions of the patient's history were reviewed and updated as appropriate: allergies, current medications, past family history, past medical history, past social history, past surgical history and problem list.    I have reviewed and agree with the HPI, ROS, and historical information as entered above. Angy Forde MD    /70   Wt 114 kg (251 lb)   LMP 2020 (Exact Date)   BMI 43.08 kg/m²         EXAM:     FHT: 143 BPM   Uterine Size: 30 cm  Pelvic Exam: No       Assessment and Plan    Problem List Items Addressed This Visit        Digestive    Obesity (BMI 30-39.9)       Other    Pregnancy - Primary    Relevant Orders    Gestational Screen 1 Hr ([facility])    Antibody Screen    CBC (No Diff)    AMA (advanced maternal age) multigravida 35+    Overview     [place] drawn at 11 weeks 6 days but quantity not sufficient.  Patient did not redraw.           Spontaneous vaginal delivery    Overview     X2.  2015 baby girl Judie weighing 6 pounds 8 ounces.  2017 baby boy Marcus         Anxiety     Overview     Restarted Zoloft during pregnancy.         Relevant Medications    sertraline (Zoloft) 50 MG tablet          1. Pregnancy at 28w6d  2. Fetal status reassuring.   3. Activity and Exercise discussed.  Return in about 4 weeks (around 1/5/2021).    Angy Forde MD  12/08/2020

## 2020-12-09 LAB
BLD GP AB SCN SERPL QL: NEGATIVE
ERYTHROCYTE [DISTWIDTH] IN BLOOD BY AUTOMATED COUNT: 12 % (ref 12.3–15.4)
GLUCOSE 1H P 50 G GLC PO SERPL-MCNC: 100 MG/DL (ref 65–139)
HCT VFR BLD AUTO: 37.3 % (ref 34–46.6)
HGB BLD-MCNC: 12.7 G/DL (ref 12–15.9)
MCH RBC QN AUTO: 31.1 PG (ref 26.6–33)
MCHC RBC AUTO-ENTMCNC: 34 G/DL (ref 31.5–35.7)
MCV RBC AUTO: 91.4 FL (ref 79–97)
PLATELET # BLD AUTO: 255 10*3/MM3 (ref 140–450)
RBC # BLD AUTO: 4.08 10*6/MM3 (ref 3.77–5.28)
WBC # BLD AUTO: 11 10*3/MM3 (ref 3.4–10.8)

## 2021-01-05 ENCOUNTER — ROUTINE PRENATAL (OUTPATIENT)
Dept: OBSTETRICS AND GYNECOLOGY | Facility: CLINIC | Age: 36
End: 2021-01-05

## 2021-01-05 VITALS — DIASTOLIC BLOOD PRESSURE: 70 MMHG | BODY MASS INDEX: 44.63 KG/M2 | WEIGHT: 260 LBS | SYSTOLIC BLOOD PRESSURE: 126 MMHG

## 2021-01-05 DIAGNOSIS — O26.849 UTERINE SIZE DATE DISCREPANCY PREGNANCY: ICD-10-CM

## 2021-01-05 DIAGNOSIS — E66.9 OBESITY (BMI 30-39.9): ICD-10-CM

## 2021-01-05 DIAGNOSIS — F41.9 ANXIETY: ICD-10-CM

## 2021-01-05 DIAGNOSIS — Z3A.32 32 WEEKS GESTATION OF PREGNANCY: Primary | ICD-10-CM

## 2021-01-05 DIAGNOSIS — O09.529 ANTEPARTUM MULTIGRAVIDA OF ADVANCED MATERNAL AGE: ICD-10-CM

## 2021-01-05 LAB
GLUCOSE UR STRIP-MCNC: NEGATIVE MG/DL
PROT UR STRIP-MCNC: NEGATIVE MG/DL

## 2021-01-05 PROCEDURE — 0502F SUBSEQUENT PRENATAL CARE: CPT | Performed by: OBSTETRICS & GYNECOLOGY

## 2021-01-05 PROCEDURE — 90715 TDAP VACCINE 7 YRS/> IM: CPT | Performed by: OBSTETRICS & GYNECOLOGY

## 2021-01-05 PROCEDURE — 90471 IMMUNIZATION ADMIN: CPT | Performed by: OBSTETRICS & GYNECOLOGY

## 2021-01-05 NOTE — PROGRESS NOTES
OB FOLLOW UP  CC- Here for care of pregnancy        Temitope Moreno is a 35 y.o.  32w6d patient being seen today for her obstetrical follow up visit. Patient reports no complaints.     Her prenatal care is complicated by (and status) :    Patient Active Problem List   Diagnosis   • Obesity (BMI 30-39.9)   • Pregnancy   • Family history of pancreatic cancer   • AMA (advanced maternal age) multigravida 35+   • Spontaneous vaginal delivery   • Anxiety       Flu Status: Already given in current flu season  Ultrasound Today: No.    ROS -   Patient Reports : swelling  Patient Denies: Loss of Fluid, Vaginal Spotting, Vision Changes and Contractions  Fetal Movement : normal  All other systems reviewed and are negative.       The additional following portions of the patient's history were reviewed and updated as appropriate: allergies, current medications, past family history, past medical history, past social history, past surgical history and problem list.    I have reviewed and agree with the HPI, ROS, and historical information as entered above. Angy Forde MD    /70   Wt 118 kg (260 lb)   LMP 2020 (Exact Date)   BMI 44.63 kg/m²       EXAM:     FHT: 160 BPM   Uterine Size: 36 cm  Pelvic Exam: No    Urine glucose/protein: See prenatal flowsheet       Assessment and Plan    Problem List Items Addressed This Visit        Other    Obesity (BMI 30-39.9)    Pregnancy - Primary    Relevant Orders    POC Urinalysis Dipstick (Completed)    AMA (advanced maternal age) multigravida 35+    Overview     [place] drawn at 11 weeks 6 days but quantity not sufficient.  Patient did not redraw.           Anxiety    Overview     Restarted Zoloft during pregnancy.         Relevant Medications    sertraline (Zoloft) 50 MG tablet        Size greater than dates we will get an ultrasound next visit.  We discussed weight gain and carb control.  1. Pregnancy at 32w6d  2. Fetal status reassuring.   3. Activity and  Exercise discussed.  Return in about 2 weeks (around 1/19/2021).    Angy Forde MD  01/05/2021

## 2021-01-08 DIAGNOSIS — F41.9 ANXIETY: ICD-10-CM

## 2021-01-19 ENCOUNTER — ROUTINE PRENATAL (OUTPATIENT)
Dept: OBSTETRICS AND GYNECOLOGY | Facility: CLINIC | Age: 36
End: 2021-01-19

## 2021-01-19 VITALS — DIASTOLIC BLOOD PRESSURE: 60 MMHG | SYSTOLIC BLOOD PRESSURE: 108 MMHG | BODY MASS INDEX: 44.8 KG/M2 | WEIGHT: 261 LBS

## 2021-01-19 DIAGNOSIS — F41.9 ANXIETY: ICD-10-CM

## 2021-01-19 DIAGNOSIS — E66.9 OBESITY (BMI 30-39.9): ICD-10-CM

## 2021-01-19 DIAGNOSIS — Z3A.34 34 WEEKS GESTATION OF PREGNANCY: Primary | ICD-10-CM

## 2021-01-19 DIAGNOSIS — O09.529 ANTEPARTUM MULTIGRAVIDA OF ADVANCED MATERNAL AGE: ICD-10-CM

## 2021-01-19 LAB
GLUCOSE UR STRIP-MCNC: NEGATIVE MG/DL
PROT UR STRIP-MCNC: NEGATIVE MG/DL

## 2021-01-19 PROCEDURE — 0502F SUBSEQUENT PRENATAL CARE: CPT | Performed by: OBSTETRICS & GYNECOLOGY

## 2021-01-19 NOTE — PROGRESS NOTES
OB FOLLOW UP  CC- Here for care of pregnancy        Temitope Moreno is a 35 y.o.  34w6d patient being seen today for her obstetrical follow up visit. Patient reports swelling.     Her prenatal care is complicated by (and status) :    Patient Active Problem List   Diagnosis   • Obesity (BMI 30-39.9)   • Pregnancy   • Family history of pancreatic cancer   • AMA (advanced maternal age) multigravida 35+   • Spontaneous vaginal delivery   • Anxiety       Flu Status: Already given in current flu season  Ultrasound Today: Yes.  Findings showed BPP 8/8.  Overall estimated fetal weight is 33rd percentile.  Size consistent with dates.  I have personally evaluated the U/S and agree with the findings. Angy Forde MD    ROS -   Patient Reports : swelling  Patient Denies: Loss of Fluid, Vaginal Spotting, Vision Changes, Headaches and Contractions  Fetal Movement : normal  All other systems reviewed and are negative.       The additional following portions of the patient's history were reviewed and updated as appropriate: allergies, current medications, past family history, past medical history, past social history, past surgical history and problem list.    I have reviewed and agree with the HPI, ROS, and historical information as entered above. Angy Forde MD    /60   Wt 118 kg (261 lb)   LMP 2020 (Exact Date)   BMI 44.80 kg/m²       EXAM:     FHT: Positive on ultrasound BPM   Uterine Size: size greater than dates  Pelvic Exam: No    Urine glucose/protein: See prenatal flowsheet       Assessment and Plan    Problem List Items Addressed This Visit        Other    Obesity (BMI 30-39.9)    Pregnancy - Primary    Relevant Orders    POC Urinalysis Dipstick (Completed)    AMA (advanced maternal age) multigravida 35+    Overview     [place] drawn at 11 weeks 6 days but quantity not sufficient.  Patient did not redraw.           Spontaneous vaginal delivery    Overview     X2.  2015 baby girl  Judie weighing 6 pounds 8 ounces.  2017 baby boy Marcus         Anxiety    Overview     Restarted Zoloft during pregnancy.         Relevant Medications    sertraline (Zoloft) 50 MG tablet      Ultrasound today was size consistent with dates.  Overall estimated fetal weight was 33rd percentile.  Weighing 5 pounds 3 ounces.  Patient does not desire induction of labor.  GBS testing and cervical check next visit.    1. Pregnancy at 34w6d  2. Fetal status reassuring.   3. Activity and Exercise discussed.  Return in about 2 weeks (around 2/2/2021).    Angy Forde MD  01/19/2021

## 2021-01-23 ENCOUNTER — HOSPITAL ENCOUNTER (OUTPATIENT)
Facility: HOSPITAL | Age: 36
End: 2021-01-23
Attending: OBSTETRICS & GYNECOLOGY | Admitting: OBSTETRICS & GYNECOLOGY

## 2021-01-26 ENCOUNTER — TELEPHONE (OUTPATIENT)
Dept: OBSTETRICS AND GYNECOLOGY | Facility: CLINIC | Age: 36
End: 2021-01-26

## 2021-01-26 NOTE — TELEPHONE ENCOUNTER
"\"Patient lvm has had severe heartburn since yesterday tums is not helping would like to discuss\"    Discussed with APRNs; recommended Prevacid OTC. Verbalized understanding. Will callback if it does not help.  "

## 2021-02-02 ENCOUNTER — ROUTINE PRENATAL (OUTPATIENT)
Dept: OBSTETRICS AND GYNECOLOGY | Facility: CLINIC | Age: 36
End: 2021-02-02

## 2021-02-02 ENCOUNTER — LAB (OUTPATIENT)
Dept: LAB | Facility: HOSPITAL | Age: 36
End: 2021-02-02

## 2021-02-02 VITALS — DIASTOLIC BLOOD PRESSURE: 70 MMHG | BODY MASS INDEX: 45.66 KG/M2 | SYSTOLIC BLOOD PRESSURE: 100 MMHG | WEIGHT: 266 LBS

## 2021-02-02 DIAGNOSIS — F41.9 ANXIETY: ICD-10-CM

## 2021-02-02 DIAGNOSIS — Z3A.36 36 WEEKS GESTATION OF PREGNANCY: Primary | ICD-10-CM

## 2021-02-02 DIAGNOSIS — E66.9 OBESITY (BMI 30-39.9): ICD-10-CM

## 2021-02-02 DIAGNOSIS — O09.529 ANTEPARTUM MULTIGRAVIDA OF ADVANCED MATERNAL AGE: ICD-10-CM

## 2021-02-02 LAB
GLUCOSE UR STRIP-MCNC: NEGATIVE MG/DL
PROT UR STRIP-MCNC: NEGATIVE MG/DL

## 2021-02-02 PROCEDURE — 0502F SUBSEQUENT PRENATAL CARE: CPT | Performed by: OBSTETRICS & GYNECOLOGY

## 2021-02-02 PROCEDURE — 87081 CULTURE SCREEN ONLY: CPT

## 2021-02-02 NOTE — PROGRESS NOTES
OB FOLLOW UP  CC- Here for care of pregnancy        Temitope Moreno is a 35 y.o.  36w6d patient being seen today for her obstetrical follow up visit. Patient reports no complaints.     Her prenatal care is complicated by (and status) :    Patient Active Problem List   Diagnosis   • Obesity (BMI 30-39.9)   • Pregnancy   • Family history of pancreatic cancer   • AMA (advanced maternal age) multigravida 35+   • Spontaneous vaginal delivery   • Anxiety         Flu Status: Already given in current flu season  GBS Status: Done Today  Her Delivery Plan is: [facility] IOL after 40wks. Needs to be scheduled  Ultrasound Today: No.    ROS -   Patient Reports : No Problems  Patient Denies: Loss of Fluid, Vaginal Spotting, Vision Changes, Headaches and Contractions  Fetal Movement : normal  All other systems reviewed and are negative.       The additional following portions of the patient's history were reviewed and updated as appropriate: allergies, current medications, past family history, past medical history, past social history, past surgical history and problem list.    I have reviewed and agree with the HPI, ROS, and historical information as entered above. Angy Forde MD        /70   Wt 121 kg (266 lb)   LMP 2020 (Exact Date)   BMI 45.66 kg/m²     EXAM:     FHT: 145 BPM   Uterine Size: 39 cm  Pelvic Exam: Yes.  Presentation: cephalic. Dilation: Closed. Effacement: Long. Station: Floating.    Urine glucose/protein: See prenatal flowsheet       Assessment and Plan    Problem List Items Addressed This Visit        Other    Obesity (BMI 30-39.9)    Pregnancy - Primary    Relevant Orders    Strep B Screen - Swab, Vaginal/Rectum    POC Urinalysis Dipstick (Completed)    AMA (advanced maternal age) multigravida 35+    Overview     [place] drawn at 11 weeks 6 days but quantity not sufficient.  Patient did not redraw.           Spontaneous vaginal delivery    Overview     X2.  2015 baby girl Judie  weighing 6 pounds 8 ounces.  2017 baby boy Marcus         Anxiety    Overview     Restarted Zoloft during pregnancy.         Relevant Medications    sertraline (Zoloft) 50 MG tablet          1. Pregnancy at 36w6d  2. Fetal status reassuring.   3. Activity and Exercise discussed.  Return in about 1 week (around 2/9/2021).    Angy Forde MD  02/02/2021

## 2021-02-05 LAB — BACTERIA SPEC AEROBE CULT: NORMAL

## 2021-02-09 ENCOUNTER — ROUTINE PRENATAL (OUTPATIENT)
Dept: OBSTETRICS AND GYNECOLOGY | Facility: CLINIC | Age: 36
End: 2021-02-09

## 2021-02-09 VITALS — BODY MASS INDEX: 46.45 KG/M2 | DIASTOLIC BLOOD PRESSURE: 80 MMHG | SYSTOLIC BLOOD PRESSURE: 110 MMHG | WEIGHT: 270.6 LBS

## 2021-02-09 DIAGNOSIS — O09.523 MULTIGRAVIDA OF ADVANCED MATERNAL AGE IN THIRD TRIMESTER: ICD-10-CM

## 2021-02-09 DIAGNOSIS — Z3A.37 37 WEEKS GESTATION OF PREGNANCY: Primary | ICD-10-CM

## 2021-02-09 LAB
EXPIRATION DATE: NORMAL
GLUCOSE UR STRIP-MCNC: NEGATIVE MG/DL
Lab: NORMAL
PROT UR STRIP-MCNC: NEGATIVE MG/DL

## 2021-02-09 PROCEDURE — 0502F SUBSEQUENT PRENATAL CARE: CPT | Performed by: NURSE PRACTITIONER

## 2021-02-09 NOTE — PROGRESS NOTES
OB FOLLOW UP  CC- Here for care of pregnancy        Temitope Moreno is a 35 y.o.  37w6d patient being seen today for her obstetrical follow up visit. Patient reports moderate swelling in her hands and feet.     She denies LOF, vaginal spotting, headaches, vision changes or amarjit pruitt/contractions. She reports good fetal movement, >10 movements in 10 hours.     Her prenatal care is complicated by (and status) :    Patient Active Problem List   Diagnosis   • Obesity (BMI 30-39.9)   • Pregnancy   • Family history of pancreatic cancer   • AMA (advanced maternal age) multigravida 35+   • Spontaneous vaginal delivery   • Anxiety         Flu Status: Already given in current flu season  GBS Status: Was already done and it was negative.   Her Delivery Plan is: Desires IOL after 40wks. Scheduled  Ultrasound Today: No.    ROS -   Patient Reports : swelling  Patient Denies: Loss of Fluid, Vaginal Spotting, Vision Changes, Headaches, Nausea , Vomiting , Contractions and Epigastric pain  Fetal Movement : >10 movements in 10 hours  All other systems reviewed and are negative.       The additional following portions of the patient's history were reviewed and updated as appropriate: allergies, current medications, past family history, past medical history, past social history, past surgical history and problem list.    I have reviewed and agree with the HPI, ROS, and historical information as entered above. Whitney Bowman, MARILYN        /80   Wt 123 kg (270 lb 9.6 oz)   LMP 2020 (Exact Date)   BMI 46.45 kg/m²     EXAM:     FHT: 130 BPM   Uterine Size: 40 cm  Pelvic Exam: yes    Urine glucose/protein: See prenatal flowsheet       Assessment and Plan    Problem List Items Addressed This Visit        Gravid and     Pregnancy - Primary    Relevant Orders    POC Glucose, Urine, Qualitative, Dipstick (Completed)    POC Protein, Urine, Qualitative, Dipstick (Completed)    US OB Follow Up Transabdominal  Approach    US Fetal Biophysical Profile Without Non Stress Testing    AMA (advanced maternal age) multigravida 35+    Overview     [place] drawn at 11 weeks 6 days but quantity not sufficient.  Patient did not redraw.                 1. Pregnancy at 37w6d  2. Fetal status reassuring.   3. Labor precautions reviewed.   Return in about 1 week (around 2/16/2021) for with U/S for growth, with Dr Forde .    Whitney Bowman, APRN  02/09/2021

## 2021-02-18 ENCOUNTER — ROUTINE PRENATAL (OUTPATIENT)
Dept: OBSTETRICS AND GYNECOLOGY | Facility: CLINIC | Age: 36
End: 2021-02-18

## 2021-02-18 VITALS — BODY MASS INDEX: 46.17 KG/M2 | DIASTOLIC BLOOD PRESSURE: 80 MMHG | SYSTOLIC BLOOD PRESSURE: 118 MMHG | WEIGHT: 269 LBS

## 2021-02-18 DIAGNOSIS — O09.523 MULTIGRAVIDA OF ADVANCED MATERNAL AGE IN THIRD TRIMESTER: ICD-10-CM

## 2021-02-18 DIAGNOSIS — Z3A.39 39 WEEKS GESTATION OF PREGNANCY: Primary | ICD-10-CM

## 2021-02-18 DIAGNOSIS — E66.9 OBESITY (BMI 30-39.9): ICD-10-CM

## 2021-02-18 LAB
GLUCOSE UR STRIP-MCNC: NEGATIVE MG/DL
PROT UR STRIP-MCNC: NEGATIVE MG/DL

## 2021-02-18 PROCEDURE — 0502F SUBSEQUENT PRENATAL CARE: CPT | Performed by: OBSTETRICS & GYNECOLOGY

## 2021-02-18 NOTE — PROGRESS NOTES
OB FOLLOW UP  CC- Here for care of pregnancy        Temitope Moreno is a 35 y.o.  39w1d patient being seen today for her obstetrical follow up visit. Patient reports no complaints.     Her prenatal care is complicated by (and status) :    Patient Active Problem List   Diagnosis   • Obesity (BMI 30-39.9)   • Pregnancy   • Family history of pancreatic cancer   • AMA (advanced maternal age) multigravida 35+   • Spontaneous vaginal delivery   • Anxiety         Flu Status: Already given in current flu season  GBS Status: Was already done and it was negative.   Her Delivery Plan is: Desires IOL after 40wks. Scheduled  Ultrasound Today: Yes.  Findings showed BPP 8/8, SHASHA 13, EFW 63rd%.  I have personally evaluated the U/S and agree with the findings. Angy Forde MD    ROS -   Patient Reports : No Problems  Patient Denies: Loss of Fluid, Vaginal Spotting, Vision Changes, Headaches and Contractions  Fetal Movement : normal  All other systems reviewed and are negative.       The additional following portions of the patient's history were reviewed and updated as appropriate: allergies, current medications, past family history, past medical history, past social history, past surgical history and problem list.    I have reviewed and agree with the HPI, ROS, and historical information as entered above. Angy Forde MD        /80   Wt 122 kg (269 lb)   LMP 2020 (Exact Date)   BMI 46.17 kg/m²     EXAM:     FHT: 142 BPM   Uterine Size: size greater than dates  Pelvic Exam: Yes.  Presentation: cephalic. Dilation: 3cm. Effacement: 50%. Station: -2.    Urine glucose/protein: See prenatal flowsheet       Assessment and Plan    Problem List Items Addressed This Visit        Endocrine and Metabolic    Obesity (BMI 30-39.9)       Gravid and     Pregnancy - Primary    Overview     IOL  @ 9:30PM         Relevant Orders    POC Urinalysis Dipstick (Completed)    AMA (advanced maternal age)  multigravida 35+    Overview     [place] drawn at 11 weeks 6 days but quantity not sufficient.  Patient did not redraw.                 1. Pregnancy at 39w1d  2. Fetal status reassuring.   3. Activity and Exercise discussed.  Return in about 1 week (around 2/25/2021).    Angy Forde MD  02/18/2021

## 2021-02-22 ENCOUNTER — ROUTINE PRENATAL (OUTPATIENT)
Dept: OBSTETRICS AND GYNECOLOGY | Facility: CLINIC | Age: 36
End: 2021-02-22

## 2021-02-22 ENCOUNTER — TELEPHONE (OUTPATIENT)
Dept: OBSTETRICS AND GYNECOLOGY | Facility: CLINIC | Age: 36
End: 2021-02-22

## 2021-02-22 VITALS — SYSTOLIC BLOOD PRESSURE: 115 MMHG | WEIGHT: 271 LBS | DIASTOLIC BLOOD PRESSURE: 62 MMHG | BODY MASS INDEX: 46.52 KG/M2

## 2021-02-22 DIAGNOSIS — Z3A.39 39 WEEKS GESTATION OF PREGNANCY: Primary | ICD-10-CM

## 2021-02-22 LAB
GLUCOSE UR STRIP-MCNC: NEGATIVE MG/DL
PROT UR STRIP-MCNC: NEGATIVE MG/DL

## 2021-02-22 PROCEDURE — 0502F SUBSEQUENT PRENATAL CARE: CPT | Performed by: NURSE PRACTITIONER

## 2021-02-22 NOTE — PROGRESS NOTES
OB FOLLOW UP  CC- Here for care of pregnancy        Temitope Moreno is a 35 y.o.  39w5d patient being seen today for her obstetrical follow up visit. Patient reports concern for possible fluid leaking. She noticed increased fluid right after voiding and another time her underwear was wet. She reports good daily fetal movement.   Patient states she had a lot of fluid loss when she used the restroom last night. Patient state today she has had to change her under clothes a time or two due to damp feeling.     Her prenatal care is complicated by (and status) :    Patient Active Problem List   Diagnosis   • Obesity (BMI 30-39.9)   • Pregnancy   • Family history of pancreatic cancer   • AMA (advanced maternal age) multigravida 35+   • Spontaneous vaginal delivery   • Anxiety         Flu Status: Already given in current flu season  GBS Status: Was already done and it was negative.   Her Delivery Plan is: Desires IOL after 40wks. Scheduled  Ultrasound Today: No.    ROS -   Patient Reports : Loss of Fluid  Patient Denies: Vaginal Spotting, Vision Changes, Headaches, Nausea , Vomiting , Contractions and Epigastric pain  Fetal Movement : normal  All other systems reviewed and are negative.       The additional following portions of the patient's history were reviewed and updated as appropriate: allergies, current medications, past family history, past medical history, past social history, past surgical history and problem list.    I have reviewed and agree with the HPI, ROS, and historical information as entered above. Georgette Kelby, APRN        /62   Wt 123 kg (271 lb)   LMP 2020 (Exact Date)   Breastfeeding No   BMI 46.52 kg/m²     EXAM:     FHT: 136 BPM   Uterine Size: size greater than dates  Pelvic Exam: Yes, nitrazine test neg, no pooling in speculum, ferning test negative.    Ultrasound today to check SHASHA= 9.7cm.  (Unable to hear FHTs well with doppler so checked fetal viability).    Urine  glucose/protein: See prenatal flowsheet       Assessment and Plan    Problem List Items Addressed This Visit        Gravid and     Pregnancy - Primary    Overview     IOL  @ 9:30PM         Relevant Orders    POC Urinalysis Dipstick (Completed)          1. Pregnancy at 39w5d  2. Fetal status reassuring.   3. Nitrazine neg, no pooling in speculum, ferning test neg. U/S showed SHASHA= 9.7cm, reviewed importance of staying well hydrated.   4. Labor signs reviewed, monitor daily fetal movement  5. Keep appt. As scheduled for 21    Georgette Lama, APRN  2021

## 2021-02-22 NOTE — TELEPHONE ENCOUNTER
Last night she had a gush of fluid when she went to bathroom.  She had been drinking a lot of water so did not think about ROM.  Today she is constantly wet so she feels like her water may have broken.    Appointment scheduled for evaluation

## 2021-02-24 ENCOUNTER — ROUTINE PRENATAL (OUTPATIENT)
Dept: OBSTETRICS AND GYNECOLOGY | Facility: CLINIC | Age: 36
End: 2021-02-24

## 2021-02-24 ENCOUNTER — PREP FOR SURGERY (OUTPATIENT)
Dept: OTHER | Facility: HOSPITAL | Age: 36
End: 2021-02-24

## 2021-02-24 VITALS — WEIGHT: 272 LBS | DIASTOLIC BLOOD PRESSURE: 70 MMHG | SYSTOLIC BLOOD PRESSURE: 108 MMHG | BODY MASS INDEX: 46.69 KG/M2

## 2021-02-24 DIAGNOSIS — Z3A.40 40 WEEKS GESTATION OF PREGNANCY: Primary | ICD-10-CM

## 2021-02-24 DIAGNOSIS — O09.523 MULTIGRAVIDA OF ADVANCED MATERNAL AGE IN THIRD TRIMESTER: ICD-10-CM

## 2021-02-24 DIAGNOSIS — Z34.90 PREGNANCY, UNSPECIFIED GESTATIONAL AGE: Primary | ICD-10-CM

## 2021-02-24 DIAGNOSIS — E66.9 OBESITY (BMI 30-39.9): ICD-10-CM

## 2021-02-24 DIAGNOSIS — F41.9 ANXIETY: ICD-10-CM

## 2021-02-24 LAB
EXPIRATION DATE: 0
GLUCOSE UR STRIP-MCNC: NEGATIVE MG/DL
Lab: 0
PROT UR STRIP-MCNC: NEGATIVE MG/DL

## 2021-02-24 PROCEDURE — 59426 ANTEPARTUM CARE ONLY: CPT | Performed by: OBSTETRICS & GYNECOLOGY

## 2021-02-24 RX ORDER — LIDOCAINE HYDROCHLORIDE 10 MG/ML
5 INJECTION, SOLUTION EPIDURAL; INFILTRATION; INTRACAUDAL; PERINEURAL AS NEEDED
Status: CANCELLED | OUTPATIENT
Start: 2021-02-24

## 2021-02-24 RX ORDER — BUTORPHANOL TARTRATE 1 MG/ML
1 INJECTION, SOLUTION INTRAMUSCULAR; INTRAVENOUS
Status: CANCELLED | OUTPATIENT
Start: 2021-02-24

## 2021-02-24 RX ORDER — SODIUM CHLORIDE, SODIUM LACTATE, POTASSIUM CHLORIDE, CALCIUM CHLORIDE 600; 310; 30; 20 MG/100ML; MG/100ML; MG/100ML; MG/100ML
125 INJECTION, SOLUTION INTRAVENOUS CONTINUOUS
Status: CANCELLED | OUTPATIENT
Start: 2021-02-24

## 2021-02-24 RX ORDER — MAGNESIUM CARB/ALUMINUM HYDROX 105-160MG
30 TABLET,CHEWABLE ORAL ONCE
Status: CANCELLED | OUTPATIENT
Start: 2021-02-24 | End: 2021-02-24

## 2021-02-24 RX ORDER — ONDANSETRON 2 MG/ML
4 INJECTION INTRAMUSCULAR; INTRAVENOUS EVERY 6 HOURS PRN
Status: CANCELLED | OUTPATIENT
Start: 2021-02-24

## 2021-02-24 RX ORDER — ONDANSETRON 4 MG/1
4 TABLET, FILM COATED ORAL EVERY 6 HOURS PRN
Status: CANCELLED | OUTPATIENT
Start: 2021-02-24

## 2021-02-24 RX ORDER — MISOPROSTOL 200 UG/1
800 TABLET ORAL AS NEEDED
Status: CANCELLED | OUTPATIENT
Start: 2021-02-24

## 2021-02-24 RX ORDER — OXYTOCIN-SODIUM CHLORIDE 0.9% IV SOLN 30 UNIT/500ML 30-0.9/5 UT/ML-%
85 SOLUTION INTRAVENOUS ONCE
Status: CANCELLED | OUTPATIENT
Start: 2021-02-24 | End: 2021-02-24

## 2021-02-24 RX ORDER — OXYTOCIN-SODIUM CHLORIDE 0.9% IV SOLN 30 UNIT/500ML 30-0.9/5 UT/ML-%
650 SOLUTION INTRAVENOUS ONCE
Status: CANCELLED | OUTPATIENT
Start: 2021-02-24 | End: 2021-02-24

## 2021-02-24 RX ORDER — CARBOPROST TROMETHAMINE 250 UG/ML
250 INJECTION, SOLUTION INTRAMUSCULAR AS NEEDED
Status: CANCELLED | OUTPATIENT
Start: 2021-02-24

## 2021-02-24 RX ORDER — METHYLERGONOVINE MALEATE 0.2 MG/ML
200 INJECTION INTRAVENOUS ONCE AS NEEDED
Status: CANCELLED | OUTPATIENT
Start: 2021-02-24

## 2021-02-24 RX ORDER — SODIUM CHLORIDE 0.9 % (FLUSH) 0.9 %
1-10 SYRINGE (ML) INJECTION AS NEEDED
Status: CANCELLED | OUTPATIENT
Start: 2021-02-24

## 2021-02-24 RX ORDER — OXYTOCIN-SODIUM CHLORIDE 0.9% IV SOLN 30 UNIT/500ML 30-0.9/5 UT/ML-%
2-24 SOLUTION INTRAVENOUS
Status: CANCELLED | OUTPATIENT
Start: 2021-02-24

## 2021-02-24 RX ORDER — SODIUM CHLORIDE 0.9 % (FLUSH) 0.9 %
3 SYRINGE (ML) INJECTION EVERY 12 HOURS SCHEDULED
Status: CANCELLED | OUTPATIENT
Start: 2021-02-24

## 2021-02-24 NOTE — PROGRESS NOTES
OB FOLLOW UP  CC- Here for care of pregnancy        Temitope Moreno is a 35 y.o.  40w0d patient being seen today for her obstetrical follow up visit. Patient reports no complaints.     Her prenatal care is complicated by (and status) :    Patient Active Problem List   Diagnosis   • Obesity (BMI 30-39.9)   • Pregnancy   • Family history of pancreatic cancer   • AMA (advanced maternal age) multigravida 35+   • Spontaneous vaginal delivery   • Anxiety         Flu Status: Already given in current flu season  GBS Status: Was already done and it was negative.   Her Delivery Plan is: Desires IOL after 40wks. Scheduled  Ultrasound Today: No.    ROS -   Patient Reports : No Problems  Patient Denies: Loss of Fluid, Vaginal Spotting, Vision Changes, Headaches, Nausea , Vomiting , Contractions and Epigastric pain  Fetal Movement : normal  All other systems reviewed and are negative.       The additional following portions of the patient's history were reviewed and updated as appropriate: allergies, current medications, past family history, past medical history, past social history, past surgical history and problem list.    I have reviewed and agree with the HPI, ROS, and historical information as entered above. Angy Forde MD        /70   Wt 123 kg (272 lb)   LMP 2020 (Exact Date)   BMI 46.69 kg/m²     EXAM:     FHT: 144 BPM   Uterine Size: size greater than dates  Pelvic Exam: 3/70/-2    Urine glucose/protein: See prenatal flowsheet       Assessment and Plan    Problem List Items Addressed This Visit        Endocrine and Metabolic    Obesity (BMI 30-39.9)       Gravid and     Pregnancy - Primary    Overview     IOL  @ 9:30PM         Relevant Orders    POC Glucose, Urine, Qualitative, Dipstick (Completed)    POC Protein, Urine, Qualitative, Dipstick (Completed)    AMA (advanced maternal age) multigravida 35+    Overview     [place] drawn at 11 weeks 6 days but quantity not  sufficient.  Patient did not redraw.           Spontaneous vaginal delivery    Overview     X2.  2015 baby girl Judie weighing 6 pounds 8 ounces.  2017 baby boy Marcus            Mental Health    Anxiety    Overview     Restarted Zoloft during pregnancy.         Relevant Medications    sertraline (Zoloft) 50 MG tablet        Patient desires induction of labor tomorrow.  Will change to 2/25/2021 at 5 AM.  Induction discussed with the patient.  We also discussed the risk of shoulder dystocia.  Covid testing in the morning.  1. Pregnancy at 40w0d  2. Fetal status reassuring.   3. Activity and Exercise discussed.  Return in about 7 weeks (around 4/14/2021) for PP.    Angy Forde MD  02/24/2021

## 2021-02-25 ENCOUNTER — HOSPITAL ENCOUNTER (INPATIENT)
Dept: LABOR AND DELIVERY | Facility: HOSPITAL | Age: 36
LOS: 2 days | Discharge: HOME | End: 2021-02-27
Attending: OBSTETRICS & GYNECOLOGY | Admitting: OBSTETRICS & GYNECOLOGY

## 2021-02-25 ENCOUNTER — ANESTHESIA EVENT (OUTPATIENT)
Dept: LABOR AND DELIVERY | Facility: HOSPITAL | Age: 36
End: 2021-02-25

## 2021-02-25 ENCOUNTER — ANESTHESIA (OUTPATIENT)
Dept: LABOR AND DELIVERY | Facility: HOSPITAL | Age: 36
End: 2021-02-25

## 2021-02-25 DIAGNOSIS — Z3A.40 40 WEEKS GESTATION OF PREGNANCY: ICD-10-CM

## 2021-02-25 LAB
ABO GROUP BLD: NORMAL
ALP SERPL-CCNC: 136 U/L (ref 39–117)
ALT SERPL W P-5'-P-CCNC: 10 U/L (ref 1–33)
AMPHET+METHAMPHET UR QL: NEGATIVE
AMPHETAMINES UR QL: NEGATIVE
AST SERPL-CCNC: 16 U/L (ref 1–32)
ATMOSPHERIC PRESS: ABNORMAL MM[HG]
ATMOSPHERIC PRESS: ABNORMAL MM[HG]
BARBITURATES UR QL SCN: NEGATIVE
BASE EXCESS BLDCOA CALC-SCNC: -2.1 MMOL/L (ref 0–2)
BASE EXCESS BLDCOV CALC-SCNC: -1 MMOL/L (ref 0–2)
BASOPHILS # BLD AUTO: 0.03 10*3/MM3 (ref 0–0.2)
BASOPHILS NFR BLD AUTO: 0.3 % (ref 0–1.5)
BDY SITE: ABNORMAL
BDY SITE: ABNORMAL
BENZODIAZ UR QL SCN: NEGATIVE
BILIRUB SERPL-MCNC: 0.2 MG/DL (ref 0–1.2)
BLD GP AB SCN SERPL QL: NEGATIVE
BODY TEMPERATURE: 37 C
BODY TEMPERATURE: 37 C
BUPRENORPHINE SERPL-MCNC: NEGATIVE NG/ML
CANNABINOIDS SERPL QL: NEGATIVE
CO2 BLDA-SCNC: 28.1 MMOL/L (ref 22–33)
CO2 BLDA-SCNC: 29 MMOL/L (ref 22–33)
COCAINE UR QL: NEGATIVE
COLLECT TME SMN: ABNORMAL
CREAT SERPL-MCNC: 0.57 MG/DL (ref 0.57–1)
DEPRECATED RDW RBC AUTO: 43.4 FL (ref 37–54)
EOSINOPHIL # BLD AUTO: 0.12 10*3/MM3 (ref 0–0.4)
EOSINOPHIL NFR BLD AUTO: 1.1 % (ref 0.3–6.2)
EPAP: 0
EPAP: 0
ERYTHROCYTE [DISTWIDTH] IN BLOOD BY AUTOMATED COUNT: 12.9 % (ref 12.3–15.4)
HCO3 BLDCOA-SCNC: 27.1 MMOL/L (ref 16.9–20.5)
HCO3 BLDCOV-SCNC: 26.5 MMOL/L (ref 18.6–21.4)
HCT VFR BLD AUTO: 41 % (ref 34–46.6)
HGB BLD-MCNC: 13.3 G/DL (ref 12–15.9)
HGB BLDA-MCNC: 16 G/DL (ref 14–18)
HGB BLDA-MCNC: 18.6 G/DL (ref 14–18)
IMM GRANULOCYTES # BLD AUTO: 0.09 10*3/MM3 (ref 0–0.05)
IMM GRANULOCYTES NFR BLD AUTO: 0.8 % (ref 0–0.5)
INHALED O2 CONCENTRATION: 21 %
INHALED O2 CONCENTRATION: 21 %
IPAP: 0
IPAP: 0
LDH SERPL-CCNC: 171 U/L (ref 135–214)
LYMPHOCYTES # BLD AUTO: 2 10*3/MM3 (ref 0.7–3.1)
LYMPHOCYTES NFR BLD AUTO: 18.2 % (ref 19.6–45.3)
MCH RBC QN AUTO: 29.9 PG (ref 26.6–33)
MCHC RBC AUTO-ENTMCNC: 32.4 G/DL (ref 31.5–35.7)
MCV RBC AUTO: 92.1 FL (ref 79–97)
METHADONE UR QL SCN: NEGATIVE
MODALITY: ABNORMAL
MODALITY: ABNORMAL
MONOCYTES # BLD AUTO: 0.95 10*3/MM3 (ref 0.1–0.9)
MONOCYTES NFR BLD AUTO: 8.7 % (ref 5–12)
NEUTROPHILS NFR BLD AUTO: 7.79 10*3/MM3 (ref 1.7–7)
NEUTROPHILS NFR BLD AUTO: 70.9 % (ref 42.7–76)
NOTE: ABNORMAL
NOTE: ABNORMAL
NRBC BLD AUTO-RTO: 0 /100 WBC (ref 0–0.2)
OPIATES UR QL: NEGATIVE
OXYCODONE UR QL SCN: NEGATIVE
PAW @ PEAK INSP FLOW SETTING VENT: 0 CMH2O
PAW @ PEAK INSP FLOW SETTING VENT: 0 CMH2O
PCO2 BLDCOA: 63.5 MMHG (ref 43.3–54.9)
PCO2 BLDCOV: 52.5 MM HG
PCP UR QL SCN: NEGATIVE
PH BLDCOA: 7.24 PH UNITS (ref 7.22–7.3)
PH BLDCOV: 7.31 PH UNITS
PLATELET # BLD AUTO: 247 10*3/MM3 (ref 140–450)
PMV BLD AUTO: 11 FL (ref 6–12)
PO2 BLDCOA: 17.5 MMHG (ref 11.5–43.3)
PO2 BLDCOV: 19.5 MM HG
PROPOXYPH UR QL: NEGATIVE
RBC # BLD AUTO: 4.45 10*6/MM3 (ref 3.77–5.28)
RH BLD: POSITIVE
SAO2 % BLDCOA: 28.4 %
SAO2 % BLDCOA: ABNORMAL %
SAO2 % BLDCOV: 41 %
SARS-COV-2 RNA RESP QL NAA+PROBE: NOT DETECTED
T&S EXPIRATION DATE: NORMAL
TOTAL RATE: 0 BREATHS/MINUTE
TOTAL RATE: 0 BREATHS/MINUTE
TRICYCLICS UR QL SCN: NEGATIVE
URATE SERPL-MCNC: 5.7 MG/DL (ref 2.4–5.7)
VENTILATOR MODE: ABNORMAL
WBC # BLD AUTO: 10.98 10*3/MM3 (ref 3.4–10.8)

## 2021-02-25 PROCEDURE — 84550 ASSAY OF BLOOD/URIC ACID: CPT | Performed by: OBSTETRICS & GYNECOLOGY

## 2021-02-25 PROCEDURE — 25010000002 ROPIVACAINE PER 1 MG: Performed by: NURSE ANESTHETIST, CERTIFIED REGISTERED

## 2021-02-25 PROCEDURE — U0004 COV-19 TEST NON-CDC HGH THRU: HCPCS | Performed by: OBSTETRICS & GYNECOLOGY

## 2021-02-25 PROCEDURE — 85025 COMPLETE CBC W/AUTO DIFF WBC: CPT | Performed by: OBSTETRICS & GYNECOLOGY

## 2021-02-25 PROCEDURE — 0KQM0ZZ REPAIR PERINEUM MUSCLE, OPEN APPROACH: ICD-10-PCS | Performed by: OBSTETRICS & GYNECOLOGY

## 2021-02-25 PROCEDURE — 84075 ASSAY ALKALINE PHOSPHATASE: CPT | Performed by: OBSTETRICS & GYNECOLOGY

## 2021-02-25 PROCEDURE — 51703 INSERT BLADDER CATH COMPLEX: CPT

## 2021-02-25 PROCEDURE — 83615 LACTATE (LD) (LDH) ENZYME: CPT | Performed by: OBSTETRICS & GYNECOLOGY

## 2021-02-25 PROCEDURE — C1755 CATHETER, INTRASPINAL: HCPCS

## 2021-02-25 PROCEDURE — 82247 BILIRUBIN TOTAL: CPT | Performed by: OBSTETRICS & GYNECOLOGY

## 2021-02-25 PROCEDURE — 25010000002 FENTANYL CITRATE (PF) 100 MCG/2ML SOLUTION: Performed by: NURSE ANESTHETIST, CERTIFIED REGISTERED

## 2021-02-25 PROCEDURE — 59410 OBSTETRICAL CARE: CPT | Performed by: OBSTETRICS & GYNECOLOGY

## 2021-02-25 PROCEDURE — 25010000002 ONDANSETRON PER 1 MG: Performed by: OBSTETRICS & GYNECOLOGY

## 2021-02-25 PROCEDURE — 86901 BLOOD TYPING SEROLOGIC RH(D): CPT | Performed by: OBSTETRICS & GYNECOLOGY

## 2021-02-25 PROCEDURE — 86850 RBC ANTIBODY SCREEN: CPT | Performed by: OBSTETRICS & GYNECOLOGY

## 2021-02-25 PROCEDURE — 84460 ALANINE AMINO (ALT) (SGPT): CPT | Performed by: OBSTETRICS & GYNECOLOGY

## 2021-02-25 PROCEDURE — 84450 TRANSFERASE (AST) (SGOT): CPT | Performed by: OBSTETRICS & GYNECOLOGY

## 2021-02-25 PROCEDURE — 82565 ASSAY OF CREATININE: CPT | Performed by: OBSTETRICS & GYNECOLOGY

## 2021-02-25 PROCEDURE — 80306 DRUG TEST PRSMV INSTRMNT: CPT | Performed by: OBSTETRICS & GYNECOLOGY

## 2021-02-25 PROCEDURE — 86900 BLOOD TYPING SEROLOGIC ABO: CPT | Performed by: OBSTETRICS & GYNECOLOGY

## 2021-02-25 PROCEDURE — 82805 BLOOD GASES W/O2 SATURATION: CPT

## 2021-02-25 PROCEDURE — C1755 CATHETER, INTRASPINAL: HCPCS | Performed by: ANESTHESIOLOGY

## 2021-02-25 RX ORDER — MISOPROSTOL 200 UG/1
800 TABLET ORAL AS NEEDED
Status: DISCONTINUED | OUTPATIENT
Start: 2021-02-25 | End: 2021-02-25 | Stop reason: HOSPADM

## 2021-02-25 RX ORDER — ONDANSETRON 4 MG/1
4 TABLET, FILM COATED ORAL EVERY 6 HOURS PRN
Status: DISCONTINUED | OUTPATIENT
Start: 2021-02-25 | End: 2021-02-27 | Stop reason: HOSPADM

## 2021-02-25 RX ORDER — LANOLIN
CREAM (ML) TOPICAL
Status: DISCONTINUED | OUTPATIENT
Start: 2021-02-25 | End: 2021-02-27 | Stop reason: HOSPADM

## 2021-02-25 RX ORDER — TRISODIUM CITRATE DIHYDRATE AND CITRIC ACID MONOHYDRATE 500; 334 MG/5ML; MG/5ML
30 SOLUTION ORAL ONCE
Status: DISCONTINUED | OUTPATIENT
Start: 2021-02-25 | End: 2021-02-25 | Stop reason: HOSPADM

## 2021-02-25 RX ORDER — PRENATAL VIT/IRON FUM/FOLIC AC 27MG-0.8MG
1 TABLET ORAL DAILY
Status: DISCONTINUED | OUTPATIENT
Start: 2021-02-25 | End: 2021-02-25

## 2021-02-25 RX ORDER — EPHEDRINE SULFATE 5 MG/ML
5 INJECTION INTRAVENOUS
Status: DISCONTINUED | OUTPATIENT
Start: 2021-02-25 | End: 2021-02-25 | Stop reason: HOSPADM

## 2021-02-25 RX ORDER — METHYLERGONOVINE MALEATE 0.2 MG/ML
200 INJECTION INTRAVENOUS ONCE AS NEEDED
Status: DISCONTINUED | OUTPATIENT
Start: 2021-02-25 | End: 2021-02-25 | Stop reason: HOSPADM

## 2021-02-25 RX ORDER — OXYCODONE HYDROCHLORIDE AND ACETAMINOPHEN 5; 325 MG/1; MG/1
1 TABLET ORAL EVERY 4 HOURS PRN
Status: DISCONTINUED | OUTPATIENT
Start: 2021-02-25 | End: 2021-02-27 | Stop reason: HOSPADM

## 2021-02-25 RX ORDER — ONDANSETRON 4 MG/1
4 TABLET, FILM COATED ORAL EVERY 6 HOURS PRN
Status: DISCONTINUED | OUTPATIENT
Start: 2021-02-25 | End: 2021-02-25 | Stop reason: HOSPADM

## 2021-02-25 RX ORDER — BUTORPHANOL TARTRATE 1 MG/ML
1 INJECTION, SOLUTION INTRAMUSCULAR; INTRAVENOUS
Status: DISCONTINUED | OUTPATIENT
Start: 2021-02-25 | End: 2021-02-25 | Stop reason: HOSPADM

## 2021-02-25 RX ORDER — HYDROCODONE BITARTRATE AND ACETAMINOPHEN 5; 325 MG/1; MG/1
1 TABLET ORAL EVERY 4 HOURS PRN
Status: DISCONTINUED | OUTPATIENT
Start: 2021-02-25 | End: 2021-02-27 | Stop reason: HOSPADM

## 2021-02-25 RX ORDER — SODIUM CHLORIDE 0.9 % (FLUSH) 0.9 %
1-10 SYRINGE (ML) INJECTION AS NEEDED
Status: DISCONTINUED | OUTPATIENT
Start: 2021-02-25 | End: 2021-02-25 | Stop reason: HOSPADM

## 2021-02-25 RX ORDER — ONDANSETRON 2 MG/ML
4 INJECTION INTRAMUSCULAR; INTRAVENOUS ONCE AS NEEDED
Status: DISCONTINUED | OUTPATIENT
Start: 2021-02-25 | End: 2021-02-25 | Stop reason: HOSPADM

## 2021-02-25 RX ORDER — SODIUM CHLORIDE 0.9 % (FLUSH) 0.9 %
3 SYRINGE (ML) INJECTION EVERY 12 HOURS SCHEDULED
Status: DISCONTINUED | OUTPATIENT
Start: 2021-02-25 | End: 2021-02-25 | Stop reason: HOSPADM

## 2021-02-25 RX ORDER — MISOPROSTOL 200 UG/1
800 TABLET ORAL AS NEEDED
Status: DISCONTINUED | OUTPATIENT
Start: 2021-02-25 | End: 2021-02-27 | Stop reason: HOSPADM

## 2021-02-25 RX ORDER — HYDROCORTISONE 25 MG/G
1 CREAM TOPICAL AS NEEDED
Status: DISCONTINUED | OUTPATIENT
Start: 2021-02-25 | End: 2021-02-27 | Stop reason: HOSPADM

## 2021-02-25 RX ORDER — LIDOCAINE HYDROCHLORIDE AND EPINEPHRINE 15; 5 MG/ML; UG/ML
INJECTION, SOLUTION EPIDURAL AS NEEDED
Status: DISCONTINUED | OUTPATIENT
Start: 2021-02-25 | End: 2021-02-25 | Stop reason: SURG

## 2021-02-25 RX ORDER — METOCLOPRAMIDE HYDROCHLORIDE 5 MG/ML
10 INJECTION INTRAMUSCULAR; INTRAVENOUS ONCE AS NEEDED
Status: DISCONTINUED | OUTPATIENT
Start: 2021-02-25 | End: 2021-02-25 | Stop reason: HOSPADM

## 2021-02-25 RX ORDER — OXYTOCIN-SODIUM CHLORIDE 0.9% IV SOLN 30 UNIT/500ML 30-0.9/5 UT/ML-%
2-24 SOLUTION INTRAVENOUS
Status: DISCONTINUED | OUTPATIENT
Start: 2021-02-25 | End: 2021-02-25 | Stop reason: HOSPADM

## 2021-02-25 RX ORDER — CARBOPROST TROMETHAMINE 250 UG/ML
250 INJECTION, SOLUTION INTRAMUSCULAR AS NEEDED
Status: DISCONTINUED | OUTPATIENT
Start: 2021-02-25 | End: 2021-02-25 | Stop reason: HOSPADM

## 2021-02-25 RX ORDER — PRENATAL VIT/IRON FUM/FOLIC AC 27MG-0.8MG
1 TABLET ORAL NIGHTLY
Status: DISCONTINUED | OUTPATIENT
Start: 2021-02-25 | End: 2021-02-27 | Stop reason: HOSPADM

## 2021-02-25 RX ORDER — ONDANSETRON 2 MG/ML
4 INJECTION INTRAMUSCULAR; INTRAVENOUS EVERY 6 HOURS PRN
Status: DISCONTINUED | OUTPATIENT
Start: 2021-02-25 | End: 2021-02-25 | Stop reason: HOSPADM

## 2021-02-25 RX ORDER — FENTANYL CITRATE 50 UG/ML
INJECTION, SOLUTION INTRAMUSCULAR; INTRAVENOUS AS NEEDED
Status: DISCONTINUED | OUTPATIENT
Start: 2021-02-25 | End: 2021-02-25 | Stop reason: SURG

## 2021-02-25 RX ORDER — ROPIVACAINE HYDROCHLORIDE 2 MG/ML
15 INJECTION, SOLUTION EPIDURAL; INFILTRATION; PERINEURAL CONTINUOUS
Status: DISCONTINUED | OUTPATIENT
Start: 2021-02-25 | End: 2021-02-26

## 2021-02-25 RX ORDER — PROMETHAZINE HYDROCHLORIDE 12.5 MG/1
12.5 TABLET ORAL EVERY 4 HOURS PRN
Status: DISCONTINUED | OUTPATIENT
Start: 2021-02-25 | End: 2021-02-27 | Stop reason: HOSPADM

## 2021-02-25 RX ORDER — DIPHENHYDRAMINE HYDROCHLORIDE 50 MG/ML
12.5 INJECTION INTRAMUSCULAR; INTRAVENOUS EVERY 8 HOURS PRN
Status: DISCONTINUED | OUTPATIENT
Start: 2021-02-25 | End: 2021-02-25 | Stop reason: HOSPADM

## 2021-02-25 RX ORDER — OXYTOCIN-SODIUM CHLORIDE 0.9% IV SOLN 30 UNIT/500ML 30-0.9/5 UT/ML-%
650 SOLUTION INTRAVENOUS ONCE
Status: DISCONTINUED | OUTPATIENT
Start: 2021-02-25 | End: 2021-02-25 | Stop reason: HOSPADM

## 2021-02-25 RX ORDER — SODIUM CHLORIDE 0.9 % (FLUSH) 0.9 %
1-10 SYRINGE (ML) INJECTION AS NEEDED
Status: DISCONTINUED | OUTPATIENT
Start: 2021-02-25 | End: 2021-02-27 | Stop reason: HOSPADM

## 2021-02-25 RX ORDER — ONDANSETRON 2 MG/ML
4 INJECTION INTRAMUSCULAR; INTRAVENOUS EVERY 6 HOURS PRN
Status: DISCONTINUED | OUTPATIENT
Start: 2021-02-25 | End: 2021-02-25

## 2021-02-25 RX ORDER — FAMOTIDINE 10 MG/ML
20 INJECTION, SOLUTION INTRAVENOUS ONCE AS NEEDED
Status: DISCONTINUED | OUTPATIENT
Start: 2021-02-25 | End: 2021-02-25 | Stop reason: HOSPADM

## 2021-02-25 RX ORDER — SODIUM CHLORIDE, SODIUM LACTATE, POTASSIUM CHLORIDE, CALCIUM CHLORIDE 600; 310; 30; 20 MG/100ML; MG/100ML; MG/100ML; MG/100ML
125 INJECTION, SOLUTION INTRAVENOUS CONTINUOUS
Status: DISCONTINUED | OUTPATIENT
Start: 2021-02-25 | End: 2021-02-26

## 2021-02-25 RX ORDER — IBUPROFEN 600 MG/1
600 TABLET ORAL EVERY 6 HOURS PRN
Status: DISCONTINUED | OUTPATIENT
Start: 2021-02-25 | End: 2021-02-27 | Stop reason: HOSPADM

## 2021-02-25 RX ORDER — MAGNESIUM CARB/ALUMINUM HYDROX 105-160MG
30 TABLET,CHEWABLE ORAL ONCE
Status: DISCONTINUED | OUTPATIENT
Start: 2021-02-25 | End: 2021-02-25 | Stop reason: HOSPADM

## 2021-02-25 RX ORDER — DOCUSATE SODIUM 100 MG/1
100 CAPSULE, LIQUID FILLED ORAL 2 TIMES DAILY
Status: DISCONTINUED | OUTPATIENT
Start: 2021-02-25 | End: 2021-02-27 | Stop reason: HOSPADM

## 2021-02-25 RX ORDER — LIDOCAINE HYDROCHLORIDE 10 MG/ML
5 INJECTION, SOLUTION EPIDURAL; INFILTRATION; INTRACAUDAL; PERINEURAL AS NEEDED
Status: DISCONTINUED | OUTPATIENT
Start: 2021-02-25 | End: 2021-02-25 | Stop reason: HOSPADM

## 2021-02-25 RX ORDER — ONDANSETRON 2 MG/ML
4 INJECTION INTRAMUSCULAR; INTRAVENOUS EVERY 6 HOURS PRN
Status: DISCONTINUED | OUTPATIENT
Start: 2021-02-25 | End: 2021-02-27 | Stop reason: HOSPADM

## 2021-02-25 RX ORDER — BISACODYL 10 MG
10 SUPPOSITORY, RECTAL RECTAL DAILY PRN
Status: DISCONTINUED | OUTPATIENT
Start: 2021-02-26 | End: 2021-02-27 | Stop reason: HOSPADM

## 2021-02-25 RX ORDER — OXYTOCIN-SODIUM CHLORIDE 0.9% IV SOLN 30 UNIT/500ML 30-0.9/5 UT/ML-%
85 SOLUTION INTRAVENOUS ONCE
Status: COMPLETED | OUTPATIENT
Start: 2021-02-25 | End: 2021-02-25

## 2021-02-25 RX ADMIN — Medication: at 15:25

## 2021-02-25 RX ADMIN — DOCUSATE SODIUM 100 MG: 100 CAPSULE, LIQUID FILLED ORAL at 21:04

## 2021-02-25 RX ADMIN — HYDROCODONE BITARTRATE AND ACETAMINOPHEN 1 TABLET: 5; 325 TABLET ORAL at 15:25

## 2021-02-25 RX ADMIN — IBUPROFEN 600 MG: 600 TABLET ORAL at 21:36

## 2021-02-25 RX ADMIN — WITCH HAZEL 1 PAD: 500 SOLUTION RECTAL; TOPICAL at 15:25

## 2021-02-25 RX ADMIN — LIDOCAINE HYDROCHLORIDE AND EPINEPHRINE 3 ML: 15; 5 INJECTION, SOLUTION EPIDURAL at 10:42

## 2021-02-25 RX ADMIN — ROPIVACAINE HYDROCHLORIDE 10 ML: 5 INJECTION, SOLUTION EPIDURAL; INFILTRATION; PERINEURAL at 10:46

## 2021-02-25 RX ADMIN — SERTRALINE HYDROCHLORIDE 50 MG: 50 TABLET ORAL at 21:09

## 2021-02-25 RX ADMIN — ROPIVACAINE HYDROCHLORIDE 15 ML/HR: 2 INJECTION, SOLUTION EPIDURAL; INFILTRATION at 10:48

## 2021-02-25 RX ADMIN — SODIUM CHLORIDE, POTASSIUM CHLORIDE, SODIUM LACTATE AND CALCIUM CHLORIDE 1000 ML: 600; 310; 30; 20 INJECTION, SOLUTION INTRAVENOUS at 10:23

## 2021-02-25 RX ADMIN — Medication: at 21:36

## 2021-02-25 RX ADMIN — LIDOCAINE HYDROCHLORIDE AND EPINEPHRINE 1 ML: 15; 5 INJECTION, SOLUTION EPIDURAL at 10:44

## 2021-02-25 RX ADMIN — FENTANYL CITRATE 100 MCG: 50 INJECTION, SOLUTION INTRAMUSCULAR; INTRAVENOUS at 10:44

## 2021-02-25 RX ADMIN — OXYTOCIN 2 MILLI-UNITS/MIN: 10 INJECTION INTRAVENOUS at 07:56

## 2021-02-25 RX ADMIN — IBUPROFEN 600 MG: 600 TABLET ORAL at 15:25

## 2021-02-25 RX ADMIN — OXYTOCIN 85 ML/HR: 10 INJECTION INTRAVENOUS at 13:46

## 2021-02-25 RX ADMIN — HYDROCORTISONE 2.5% 1 APPLICATION: 25 CREAM TOPICAL at 15:25

## 2021-02-25 RX ADMIN — ONDANSETRON 4 MG: 2 INJECTION INTRAMUSCULAR; INTRAVENOUS at 10:00

## 2021-02-25 RX ADMIN — PRENATAL VITAMINS-IRON FUMARATE 27 MG IRON-FOLIC ACID 0.8 MG TABLET 1 TABLET: at 21:04

## 2021-02-25 NOTE — ANESTHESIA PREPROCEDURE EVALUATION
Anesthesia Evaluation     Patient summary reviewed and Nursing notes reviewed   NPO Solid Status: > 6 hours  NPO Liquid Status: > 6 hours           Airway   Dental      Pulmonary - negative pulmonary ROS   Cardiovascular - negative cardio ROS        Neuro/Psych  (+) psychiatric history Depression,     GI/Hepatic/Renal/Endo    (+) obesity, morbid obesity,      Musculoskeletal (-) negative ROS    Abdominal    Substance History - negative use     OB/GYN    (+) Pregnant,         Other                        Anesthesia Plan    ASA 3     epidural       Anesthetic plan, all risks, benefits, and alternatives have been provided, discussed and informed consent has been obtained with: patient.

## 2021-02-25 NOTE — ANESTHESIA PROCEDURE NOTES
Labor Epidural      Patient reassessed immediately prior to procedure    Patient location during procedure: OB  Performed By  CRNA: Jocy Hart CRNA  Preanesthetic Checklist  Completed: patient identified, surgical consent, pre-op evaluation, timeout performed, IV checked, risks and benefits discussed and monitors and equipment checked  Prep:  Pt Position:sitting  Sterile Tech:cap, gloves, mask and sterile barrier  Prep:DuraPrep  Monitoring:blood pressure monitoring  Epidural Block Procedure:  Approach:midline  Guidance:palpation technique  Location:L3-L4  Needle Type:Tuohy  Needle Gauge:17 G  Loss of Resistance Medium: saline  Loss of Resistance: 7cm  Cath Depth at skin:14 cm  Paresthesia: none  Aspiration:negative  Test Dose:negative  Number of Attempts: 1  Post Assessment:  Dressing:occlusive dressing applied and secured with tape  Pt Tolerance:patient tolerated the procedure well with no apparent complications  Complications:no

## 2021-02-26 LAB
BASOPHILS # BLD AUTO: 0.04 10*3/MM3 (ref 0–0.2)
BASOPHILS NFR BLD AUTO: 0.4 % (ref 0–1.5)
DEPRECATED RDW RBC AUTO: 46.7 FL (ref 37–54)
EOSINOPHIL # BLD AUTO: 0.21 10*3/MM3 (ref 0–0.4)
EOSINOPHIL NFR BLD AUTO: 1.9 % (ref 0.3–6.2)
ERYTHROCYTE [DISTWIDTH] IN BLOOD BY AUTOMATED COUNT: 13.2 % (ref 12.3–15.4)
HCT VFR BLD AUTO: 39.2 % (ref 34–46.6)
HGB BLD-MCNC: 12.1 G/DL (ref 12–15.9)
IMM GRANULOCYTES # BLD AUTO: 0.07 10*3/MM3 (ref 0–0.05)
IMM GRANULOCYTES NFR BLD AUTO: 0.6 % (ref 0–0.5)
LYMPHOCYTES # BLD AUTO: 1.82 10*3/MM3 (ref 0.7–3.1)
LYMPHOCYTES NFR BLD AUTO: 16.8 % (ref 19.6–45.3)
MCH RBC QN AUTO: 30.5 PG (ref 26.6–33)
MCHC RBC AUTO-ENTMCNC: 30.9 G/DL (ref 31.5–35.7)
MCV RBC AUTO: 98.7 FL (ref 79–97)
MONOCYTES # BLD AUTO: 0.85 10*3/MM3 (ref 0.1–0.9)
MONOCYTES NFR BLD AUTO: 7.8 % (ref 5–12)
NEUTROPHILS NFR BLD AUTO: 7.84 10*3/MM3 (ref 1.7–7)
NEUTROPHILS NFR BLD AUTO: 72.5 % (ref 42.7–76)
NRBC BLD AUTO-RTO: 0 /100 WBC (ref 0–0.2)
PLATELET # BLD AUTO: 190 10*3/MM3 (ref 140–450)
PMV BLD AUTO: 10.9 FL (ref 6–12)
RBC # BLD AUTO: 3.97 10*6/MM3 (ref 3.77–5.28)
WBC # BLD AUTO: 10.83 10*3/MM3 (ref 3.4–10.8)

## 2021-02-26 PROCEDURE — 0503F POSTPARTUM CARE VISIT: CPT | Performed by: NURSE PRACTITIONER

## 2021-02-26 PROCEDURE — 85025 COMPLETE CBC W/AUTO DIFF WBC: CPT | Performed by: OBSTETRICS & GYNECOLOGY

## 2021-02-26 RX ADMIN — SERTRALINE HYDROCHLORIDE 50 MG: 50 TABLET ORAL at 20:41

## 2021-02-26 RX ADMIN — PRENATAL VITAMINS-IRON FUMARATE 27 MG IRON-FOLIC ACID 0.8 MG TABLET 1 TABLET: at 20:41

## 2021-02-26 RX ADMIN — IBUPROFEN 600 MG: 600 TABLET ORAL at 08:57

## 2021-02-26 RX ADMIN — HYDROCODONE BITARTRATE AND ACETAMINOPHEN 1 TABLET: 5; 325 TABLET ORAL at 20:41

## 2021-02-26 RX ADMIN — IBUPROFEN 600 MG: 600 TABLET ORAL at 20:41

## 2021-02-26 RX ADMIN — DOCUSATE SODIUM 100 MG: 100 CAPSULE, LIQUID FILLED ORAL at 08:57

## 2021-02-26 RX ADMIN — DOCUSATE SODIUM 100 MG: 100 CAPSULE, LIQUID FILLED ORAL at 20:41

## 2021-02-26 NOTE — ANESTHESIA POSTPROCEDURE EVALUATION
Patient: Temitope Moreno    Procedure Summary     Date: 02/25/21 Room / Location:     Anesthesia Start: 1030 Anesthesia Stop: 1222    Procedure: LABOR ANALGESIA Diagnosis:     Scheduled Providers:  Provider: Lazaro Lopez DO    Anesthesia Type: epidural ASA Status: 3          Anesthesia Type: epidural    Vitals  Vitals Value Taken Time   /55 02/26/21 0730   Temp 98.1 °F (36.7 °C) 02/26/21 0730   Pulse 66 02/26/21 0730   Resp 16 02/26/21 0730   SpO2             Post Anesthesia Care and Evaluation    Patient location during evaluation: bedside  Patient participation: complete - patient participated  Level of consciousness: awake and alert  Pain management: adequate  Airway patency: patent  Anesthetic complications: No anesthetic complications    Cardiovascular status: acceptable  Respiratory status: acceptable  Hydration status: acceptable  Post Neuraxial Block status: Motor and sensory function returned to baseline and No signs or symptoms of PDPH

## 2021-02-27 VITALS
HEART RATE: 63 BPM | RESPIRATION RATE: 16 BRPM | WEIGHT: 272 LBS | TEMPERATURE: 97.7 F | BODY MASS INDEX: 46.44 KG/M2 | HEIGHT: 64 IN | SYSTOLIC BLOOD PRESSURE: 130 MMHG | DIASTOLIC BLOOD PRESSURE: 63 MMHG

## 2021-02-27 PROBLEM — Z34.90 PREGNANCY: Status: RESOLVED | Noted: 2020-09-03 | Resolved: 2021-02-27

## 2021-02-27 PROBLEM — O09.529 AMA (ADVANCED MATERNAL AGE) MULTIGRAVIDA 35+: Status: RESOLVED | Noted: 2020-09-03 | Resolved: 2021-02-27

## 2021-02-27 PROCEDURE — 0503F POSTPARTUM CARE VISIT: CPT | Performed by: NURSE PRACTITIONER

## 2021-02-27 RX ORDER — IBUPROFEN 600 MG/1
600 TABLET ORAL EVERY 6 HOURS PRN
Qty: 30 TABLET | Refills: 0 | Status: SHIPPED | OUTPATIENT
Start: 2021-02-27 | End: 2021-09-09

## 2021-02-27 RX ADMIN — DOCUSATE SODIUM 100 MG: 100 CAPSULE, LIQUID FILLED ORAL at 07:54

## 2021-02-27 RX ADMIN — HYDROCODONE BITARTRATE AND ACETAMINOPHEN 1 TABLET: 5; 325 TABLET ORAL at 01:26

## 2021-02-27 RX ADMIN — IBUPROFEN 600 MG: 600 TABLET ORAL at 07:54

## 2021-02-27 RX ADMIN — WITCH HAZEL 1 PAD: 500 SOLUTION RECTAL; TOPICAL at 07:54

## 2021-03-01 NOTE — PAYOR COMM NOTE
"Temitope Moreno (35 y.o. Female)     Auth#[id number]    Discharged 2/27/21 with Baby.    From: Marta Macktomi  #[phone]  Fax#[phone]      Date of Birth Social Security Number Address Home Phone MRN    1985  [address] [place] [address] [phone] [id number]    [employer] Status          B[employer]hism        Admission Date Admission Type Admitting Provider Attending Provider Department, Room/Bed    2/25/21 Elective Angy Forde MD  [facility] MOTHER BABY 4A, N411/1    Discharge Date Discharge Disposition Discharge Destination        2/27/2021 Home or Self Care              Attending Provider: (none)   Allergies: Lactose Intolerance (Gi)    Isolation: None   Infection: None   Code Status: Prior    Ht: 162.6 cm (64\")   Wt: 123 kg (272 lb)    Admission Cmt: None   Principal Problem: None                Active Insurance as of 2/25/2021     Primary Coverage     Payor Plan Insurance Group Employer/Plan Group    [facility] [id number]     Payor Plan Address Payor Plan Phone Number Payor Plan Fax Number Effective Dates    [address]   1/1/2021 - None Entered    [facility] [id number]       Subscriber Name Subscriber Birth Date Member ID       TEMITOPE MORENO 1985 [id number]                 Emergency Contacts      (Rel.) Home Phone Work Phone Mobile Phone    Segun Moreno (Spouse) [phone] -- [phone]               Discharge Summary      Sapphire Major APRN at 02/27/21 0905          Discharge Summary    Date of Admission: 2/25/2021  Date of Discharge:  2/27/2021      Patient: Temitope Moreno      MR#:[id number]    Delivery Provider: Angy Forde     Presenting Problem/History of Present Illness  Pregnancy [Z34.90]     Patient Active Problem List   Diagnosis   • Obesity (BMI 30-39.9)   • Family history of pancreatic cancer   • Anxiety   • Shoulder dystocia during labor and delivery "         Discharge Diagnosis: Vaginal delivery at 40w1d    Procedures:  Vaginal, Spontaneous     2021    12:17 PM        Hospital Course  Patient is a 35 y.o. female  at 40w1d status post vaginal delivery without complication. Postpartum the patient did well. She remained afebrile, with vital signs stable. She was ready for discharge on postpartum day 2.  Baby boy well; circ done per Dr Forde.    Infant:   male  fetus 3755 g (8 lb 4.5 oz)  with Apgar scores of 6 , 9  at five minutes.    Condition on Discharge:  Stable    Vital Signs  Temp:  [97.7 °F (36.5 °C)-98.2 °F (36.8 °C)] 97.7 °F (36.5 °C)  Heart Rate:  [63-71] 63  Resp:  [16] 16  BP: (115-139)/(58-63) 130/63    Lab Results   Component Value Date    WBC 10.83 (H) 2021    HGB 12.1 2021    HCT 39.2 2021    MCV 98.7 (H) 2021     2021       Discharge Disposition  Home or Self Care    Discharge Medications     Discharge Medications      New Medications      Instructions Start Date   ibuprofen 600 MG tablet  Commonly known as: ADVIL,MOTRIN   600 mg, Oral, Every 6 Hours PRN         Continue These Medications      Instructions Start Date   Breast Pump misc   Use PRN      cetirizine 5 MG tablet  Commonly known as: zyrTEC   5 mg, Oral, Daily      PRENATAL VITAMINS PO   Oral      sertraline 50 MG tablet  Commonly known as: Zoloft   50 mg, Oral, Daily             Discharge Diet:  as tolerated    Activity at Discharge:  no tampons, tub baths, intercourse x 6 wks    Follow-up Appointments  Future Appointments   Date Time Provider Department Center   2021  9:30 AM Angy Forde MD [facility]  [facility]         MARILYN Escobar  21  09:05 EST  [facility]          Electronically signed by Sapphire Major APRN at 21 0907

## 2021-04-14 ENCOUNTER — POSTPARTUM VISIT (OUTPATIENT)
Dept: OBSTETRICS AND GYNECOLOGY | Facility: CLINIC | Age: 36
End: 2021-04-14

## 2021-04-14 VITALS — SYSTOLIC BLOOD PRESSURE: 126 MMHG | BODY MASS INDEX: 45.49 KG/M2 | WEIGHT: 265 LBS | DIASTOLIC BLOOD PRESSURE: 80 MMHG

## 2021-04-14 DIAGNOSIS — R39.15 URINARY URGENCY: Primary | ICD-10-CM

## 2021-04-14 DIAGNOSIS — F41.9 ANXIETY: ICD-10-CM

## 2021-04-14 LAB
BILIRUB BLD-MCNC: NEGATIVE MG/DL
CLARITY, POC: CLEAR
COLOR UR: YELLOW
GLUCOSE UR STRIP-MCNC: NEGATIVE MG/DL
KETONES UR QL: NEGATIVE
LEUKOCYTE EST, POC: ABNORMAL
NITRITE UR-MCNC: NEGATIVE MG/ML
PH UR: 7 [PH] (ref 5–8)
PROT UR STRIP-MCNC: NEGATIVE MG/DL
RBC # UR STRIP: ABNORMAL /UL
SP GR UR: 1.01 (ref 1–1.03)
UROBILINOGEN UR QL: NORMAL

## 2021-04-14 PROCEDURE — 81002 URINALYSIS NONAUTO W/O SCOPE: CPT | Performed by: OBSTETRICS & GYNECOLOGY

## 2021-04-14 PROCEDURE — 99213 OFFICE O/P EST LOW 20 MIN: CPT | Performed by: OBSTETRICS & GYNECOLOGY

## 2021-04-14 RX ORDER — SERTRALINE HYDROCHLORIDE 100 MG/1
100 TABLET, FILM COATED ORAL DAILY
Qty: 90 TABLET | Refills: 3 | Status: SHIPPED | OUTPATIENT
Start: 2021-04-14 | End: 2022-05-13

## 2021-04-14 NOTE — PROGRESS NOTES
Chief Complaint   Patient presents with   • Postpartum Care     6wk       6 Week Postpartum Visit         Temitope Moreno is a 35 y.o.  s/p  at 40 weeks on 21, who presents today for a 6 week postpartum check.      At the time of delivery were you diagnosed with any of the following: Shoulder dystocia. The laceration was 2nd degree and is healing well.    Patient denies postpartum depression.  Patient describes bleeding as absent.  Patient is breast and bottle feeding.  Desires contraceptive methods: Condoms for contraception.  Patient denies bowel issues. She c/o feeling like she has to pee a lot and unable to fully empty.    Postpartum Depression Screening Questionnaire: 13, she reports feeling more fatigued than sad. States she is not crying like she was with her first. She feels better this time than she did with the other two but states she feels like she would benefit from an increase in her Zoloft.  Baby Name: Mau Caputo  Baby Weight: 8#5oz  Baby Discharged: Discharged with Mom  Delivering Physician: MARINA    Last Completed Pap Smear       Status Date      PAP SMEAR Done 2020 negative        Is the patient due for a pap today? No    The additional following portions of the patient's history were reviewed and updated as appropriate: allergies, current medications, past family history, past medical history, past social history, past surgical history and problem list.    Review of Systems   Constitutional: Positive for fatigue.   HENT: Negative.    Eyes: Negative.    Respiratory: Negative.    Cardiovascular: Negative.    Gastrointestinal: Negative.    Endocrine: Negative.    Genitourinary: Positive for urgency.   Musculoskeletal: Negative.    Skin: Negative.    Allergic/Immunologic: Negative.    Neurological: Negative.    Hematological: Negative.    Psychiatric/Behavioral: Negative.  Positive for stress.     All other systems reviewed and are negative.     I have reviewed and agree with the  HPI, ROS, and historical information as entered above. Angy Forde MD    /80   Wt 120 kg (265 lb)   LMP 05/20/2020 (Exact Date)   BMI 45.49 kg/m²     Physical Exam  Vitals and nursing note reviewed. Exam conducted with a chaperone present.   Constitutional:       Appearance: She is well-developed.   HENT:      Head: Normocephalic and atraumatic.   Pulmonary:      Effort: Pulmonary effort is normal.   Abdominal:      Palpations: Abdomen is soft. Abdomen is not rigid.   Genitourinary:     Vagina: No lesions or prolapsed vaginal walls.      Cervix: No lesion or erythema.      Uterus: Enlarged. Not tender and no uterine prolapse.       Adnexa:         Right: No mass, tenderness or fullness.          Left: No mass, tenderness or fullness.     Musculoskeletal:      Cervical back: Normal range of motion.   Neurological:      Mental Status: She is alert and oriented to person, place, and time.   Psychiatric:         Mood and Affect: Mood normal.         Behavior: Behavior normal.             Assessment and Plan    Problem List Items Addressed This Visit        Mental Health    Anxiety    Overview     On Zoloft, feels like it needs to be increased.  Will increase to 100 mg daily         Relevant Medications    sertraline (Zoloft) 100 MG tablet      Other Visit Diagnoses     Urinary urgency    -  Primary    Relevant Orders    POC Urinalysis Dipstick (Completed)    Urine Culture - Urine, Urine, Clean Catch      Will send urine culture.  Patient reports feeling like she has to go to the restroom a lot.  She does not have any incontinence.    1. S/p Vaginal delivery, 6 weeks postpartum.  Doing well.    2. Return to normal physical activity.  No pelvic restrictions.   3. Contraception: contraceptive methods: Condoms  4. Follow up for Annual.     Angy Forde MD  04/14/2021

## 2021-04-16 LAB
BACTERIA UR CULT: NO GROWTH
BACTERIA UR CULT: NORMAL

## 2021-09-02 ENCOUNTER — E-VISIT (OUTPATIENT)
Dept: FAMILY MEDICINE CLINIC | Facility: TELEHEALTH | Age: 36
End: 2021-09-02

## 2021-09-02 PROCEDURE — 99422 OL DIG E/M SVC 11-20 MIN: CPT | Performed by: NURSE PRACTITIONER

## 2021-09-02 RX ORDER — METHYLPREDNISOLONE 4 MG/1
TABLET ORAL
Qty: 1 EACH | Refills: 0 | Status: SHIPPED | OUTPATIENT
Start: 2021-09-02 | End: 2021-09-17

## 2021-09-02 RX ORDER — AMOXICILLIN AND CLAVULANATE POTASSIUM 875; 125 MG/1; MG/1
1 TABLET, FILM COATED ORAL 2 TIMES DAILY
Qty: 20 TABLET | Refills: 0 | Status: SHIPPED | OUTPATIENT
Start: 2021-09-02 | End: 2021-09-12

## 2021-09-02 NOTE — PATIENT INSTRUCTIONS
-Take all meds as prescribed even if you start feeling better  May use tylenol or warm moist compress on the face for pain. Avoid ibuprofen and sudafed while taking medrol.  -May use saline nasal spray, netipot or flonase as desired  -If symptoms worsen or do not improve in 1 week follow up with urgent care or your primary care provider        Sinusitis, Adult  Sinusitis is soreness and swelling (inflammation) of your sinuses. Sinuses are hollow spaces in the bones around your face. They are located:  · Around your eyes.  · In the middle of your forehead.  · Behind your nose.  · In your cheekbones.  Your sinuses and nasal passages are lined with a fluid called mucus. Mucus drains out of your sinuses. Swelling can trap mucus in your sinuses. This lets germs (bacteria, virus, or fungus) grow, which leads to infection. Most of the time, this condition is caused by a virus.  What are the causes?  This condition is caused by:  · Allergies.  · Asthma.  · Germs.  · Things that block your nose or sinuses.  · Growths in the nose (nasal polyps).  · Chemicals or irritants in the air.  · Fungus (rare).  What increases the risk?  You are more likely to develop this condition if:  · You have a weak body defense system (immune system).  · You do a lot of swimming or diving.  · You use nasal sprays too much.  · You smoke.  What are the signs or symptoms?  The main symptoms of this condition are pain and a feeling of pressure around the sinuses. Other symptoms include:  · Stuffy nose (congestion).  · Runny nose (drainage).  · Swelling and warmth in the sinuses.  · Headache.  · Toothache.  · A cough that may get worse at night.  · Mucus that collects in the throat or the back of the nose (postnasal drip).  · Being unable to smell and taste.  · Being very tired (fatigue).  · A fever.  · Sore throat.  · Bad breath.  How is this diagnosed?  This condition is diagnosed based on:  · Your symptoms.  · Your medical history.  · A physical  exam.  · Tests to find out if your condition is short-term (acute) or long-term (chronic). Your doctor may:  ? Check your nose for growths (polyps).  ? Check your sinuses using a tool that has a light (endoscope).  ? Check for allergies or germs.  ? Do imaging tests, such as an MRI or CT scan.  How is this treated?  Treatment for this condition depends on the cause and whether it is short-term or long-term.  · If caused by a virus, your symptoms should go away on their own within 10 days. You may be given medicines to relieve symptoms. They include:  ? Medicines that shrink swollen tissue in the nose.  ? Medicines that treat allergies (antihistamines).  ? A spray that treats swelling of the nostrils.   ? Rinses that help get rid of thick mucus in your nose (nasal saline washes).  · If caused by bacteria, your doctor may wait to see if you will get better without treatment. You may be given antibiotic medicine if you have:  ? A very bad infection.  ? A weak body defense system.  · If caused by growths in the nose, you may need to have surgery.  Follow these instructions at home:  Medicines  · Take, use, or apply over-the-counter and prescription medicines only as told by your doctor. These may include nasal sprays.  · If you were prescribed an antibiotic medicine, take it as told by your doctor. Do not stop taking the antibiotic even if you start to feel better.  Hydrate and humidify    · Drink enough water to keep your pee (urine) pale yellow.  · Use a cool mist humidifier to keep the humidity level in your home above 50%.  · Breathe in steam for 10-15 minutes, 3-4 times a day, or as told by your doctor. You can do this in the bathroom while a hot shower is running.  · Try not to spend time in cool or dry air.  Rest  · Rest as much as you can.  · Sleep with your head raised (elevated).  · Make sure you get enough sleep each night.  General instructions    · Put a warm, moist washcloth on your face 3-4 times a day,  or as often as told by your doctor. This will help with discomfort.  · Wash your hands often with soap and water. If there is no soap and water, use hand .  · Do not smoke. Avoid being around people who are smoking (secondhand smoke).  · Keep all follow-up visits as told by your doctor. This is important.  Contact a doctor if:  · You have a fever.  · Your symptoms get worse.  · Your symptoms do not get better within 10 days.  Get help right away if:  · You have a very bad headache.  · You cannot stop throwing up (vomiting).  · You have very bad pain or swelling around your face or eyes.  · You have trouble seeing.  · You feel confused.  · Your neck is stiff.  · You have trouble breathing.  Summary  · Sinusitis is swelling of your sinuses. Sinuses are hollow spaces in the bones around your face.  · This condition is caused by tissues in your nose that become inflamed or swollen. This traps germs. These can lead to infection.  · If you were prescribed an antibiotic medicine, take it as told by your doctor. Do not stop taking it even if you start to feel better.  · Keep all follow-up visits as told by your doctor. This is important.  This information is not intended to replace advice given to you by your health care provider. Make sure you discuss any questions you have with your health care provider.  Document Revised: 05/20/2019 Document Reviewed: 05/20/2019  [employer] Patient Education © 2021 [employer].  Amoxicillin; Clavulanic Acid Tablets  What is this medicine?  AMOXICILLIN; CLAVULANIC ACID (a mox i RENU in; MIKAELA carlson AS id) is a penicillin antibiotic. It treats some infections caused by bacteria. It will not work for colds, the flu, or other viruses.  This medicine may be used for other purposes; ask your health care provider or pharmacist if you have questions.  COMMON BRAND NAME(S): Augmentin  What should I tell my health care provider before I take this medicine?  They need to know if you have  any of these conditions:  · bowel disease, like colitis  · kidney disease  · liver disease  · mononucleosis  · an unusual or allergic reaction to amoxicillin, penicillin, cephalosporin, other antibiotics, clavulanic acid, other medicines, foods, dyes, or preservatives  · pregnant or trying to get pregnant  · breast-feeding  How should I use this medicine?  Take this drug by mouth. Take it as directed on the prescription label at the same time every day. Take it with food at the start of a meal or snack. Take all of this drug unless your health care provider tells you to stop it early. Keep taking it even if you think you are better.  Talk to your health care provider about the use of this drug in children. While it may be prescribed for selected conditions, precautions do apply.  Overdosage: If you think you have taken too much of this medicine contact a poison control center or emergency room at once.  NOTE: This medicine is only for you. Do not share this medicine with others.  What if I miss a dose?  If you miss a dose, take it as soon as you can. If it is almost time for your next dose, take only that dose. Do not take double or extra doses.  What may interact with this medicine?  · allopurinol  · anticoagulants  · birth control pills  · methotrexate  · probenecid  This list may not describe all possible interactions. Give your health care provider a list of all the medicines, herbs, non-prescription drugs, or dietary supplements you use. Also tell them if you smoke, drink alcohol, or use illegal drugs. Some items may interact with your medicine.  What should I watch for while using this medicine?  Tell your doctor or healthcare provider if your symptoms do not improve.  This medicine may cause serious skin reactions. They can happen weeks to months after starting the medicine. Contact your healthcare provider right away if you notice fevers or flu-like symptoms with a rash. The rash may be red or purple and  then turn into blisters or peeling of the skin. Or, you might notice a red rash with swelling of the face, lips or lymph nodes in your neck or under your arms.  Do not treat diarrhea with over the counter products. Contact your doctor if you have diarrhea that lasts more than 2 days or if it is severe and watery.  If you have diabetes, you may get a false-positive result for sugar in your urine. Check with your doctor or healthcare provider.  Birth control pills may not work properly while you are taking this medicine. Talk to your doctor about using an extra method of birth control.  What side effects may I notice from receiving this medicine?  Side effects that you should report to your doctor or health care professional as soon as possible:  · allergic reactions like skin rash, itching or hives, swelling of the face, lips, or tongue  · breathing problems  · dark urine  · fever or chills, sore throat  · redness, blistering, peeling, or loosening of the skin, including inside the mouth  · seizures  · trouble passing urine or change in the amount of urine  · unusual bleeding, bruising  · unusually weak or tired  · white patches or sores in the mouth or throat  Side effects that usually do not require medical attention (report to your doctor or health care professional if they continue or are bothersome):  · diarrhea  · dizziness  · headache  · nausea, vomiting  · stomach upset  · vaginal or anal irritation  This list may not describe all possible side effects. Call your doctor for medical advice about side effects. You may report side effects to FDA at 9-549-IAL-3201.  Where should I keep my medicine?  Keep out of the reach of children and pets.  Store at room temperature between 20 and 25 degrees C (68 and 77 degrees F). Throw away any unused drug after the expiration date.  NOTE: This sheet is a summary. It may not cover all possible information. If you have questions about this medicine, talk to your doctor,  pharmacist, or health care provider.  © 2021 [employer] (2020-07-20 11:55:53)  Methylprednisolone tablets  What is this medicine?  METHYLPREDNISOLONE (meth ill pred NISS oh lone) is a corticosteroid. It is commonly used to treat inflammation of the skin, joints, lungs, and other organs. Common conditions treated include asthma, allergies, and arthritis. It is also used for other conditions, such as blood disorders and diseases of the adrenal glands.  This medicine may be used for other purposes; ask your health care provider or pharmacist if you have questions.  COMMON BRAND NAME(S): Medrol, Medrol Dosepak  What should I tell my health care provider before I take this medicine?  They need to know if you have any of these conditions:  · Cushing's syndrome  · eye disease, vision problems  · diabetes  · glaucoma  · heart disease  · high blood pressure  · infection (especially a virus infection such as chickenpox, cold sores, or herpes)  · liver disease  · mental illness  · myasthenia gravis  · osteoporosis  · recently received or scheduled to receive a vaccine  · seizures  · stomach or intestine problems  · thyroid disease  · an unusual or allergic reaction to lactose, methylprednisolone, other medicines, foods, dyes, or preservatives  · pregnant or trying to get pregnant  · breast-feeding  How should I use this medicine?  Take this medicine by mouth with a glass of water. Follow the directions on the prescription label. Take this medicine with food. If you are taking this medicine once a day, take it in the morning. Do not take it more often than directed. Do not suddenly stop taking your medicine because you may develop a severe reaction. Your doctor will tell you how much medicine to take. If your doctor wants you to stop the medicine, the dose may be slowly lowered over time to avoid any side effects.  Talk to your pediatrician regarding the use of this medicine in children. Special care may be  needed.  Overdosage: If you think you have taken too much of this medicine contact a poison control center or emergency room at once.  NOTE: This medicine is only for you. Do not share this medicine with others.  What if I miss a dose?  If you miss a dose, take it as soon as you can. If it is almost time for your next dose, talk to your doctor or health care professional. You may need to miss a dose or take an extra dose. Do not take double or extra doses without advice.  What may interact with this medicine?  Do not take this medicine with any of the following medications:  · alefacept  · echinacea  · live virus vaccines  · metyrapone  · mifepristone  This medicine may also interact with the following medications:  · amphotericin B  · aspirin and aspirin-like medicines  · certain antibiotics like erythromycin, clarithromycin, troleandomycin  · certain medicines for diabetes  · certain medicines for fungal infections like ketoconazole  · certain medicines for seizures like carbamazepine, phenobarbital, phenytoin  · certain medicines that treat or prevent blood clots like warfarin  · cholestyramine  · cyclosporine  · digoxin  · diuretics  · female hormones, like estrogens and birth control pills  · isoniazid  · NSAIDs, medicines for pain inflammation, like ibuprofen or naproxen  · other medicines for myasthenia gravis  · rifampin  · vaccines  This list may not describe all possible interactions. Give your health care provider a list of all the medicines, herbs, non-prescription drugs, or dietary supplements you use. Also tell them if you smoke, drink alcohol, or use illegal drugs. Some items may interact with your medicine.  What should I watch for while using this medicine?  Tell your doctor or healthcare professional if your symptoms do not start to get better or if they get worse. Do not stop taking except on your doctor's advice. You may develop a severe reaction. Your doctor will tell you how much medicine to  take.  This medicine may increase your risk of getting an infection. Tell your doctor or health care professional if you are around anyone with measles or chickenpox, or if you develop sores or blisters that do not heal properly.  This medicine may increase blood sugar levels. Ask your healthcare provider if changes in diet or medicines are needed if you have diabetes.  Tell your doctor or health care professional right away if you have any change in your eyesight.  Using this medicine for a long time may increase your risk of low bone mass. Talk to your doctor about bone health.  What side effects may I notice from receiving this medicine?  Side effects that you should report to your doctor or health care professional as soon as possible:  · allergic reactions like skin rash, itching or hives, swelling of the face, lips, or tongue  · bloody or tarry stools  · hallucination, loss of contact with reality  · muscle cramps  · muscle pain  · palpitations  · signs and symptoms of high blood sugar such as being more thirsty or hungry or having to urinate more than normal. You may also feel very tired or have blurry vision.  · signs and symptoms of infection like fever or chills; cough; sore throat; pain or trouble passing urine  Side effects that usually do not require medical attention (report to your doctor or health care professional if they continue or are bothersome):  · changes in emotions or mood  · constipation  · diarrhea  · excessive hair growth on the face or body  · headache  · nausea, vomiting  · trouble sleeping  · weight gain  This list may not describe all possible side effects. Call your doctor for medical advice about side effects. You may report side effects to FDA at 6-206-FDA-8635.  Where should I keep my medicine?  Keep out of the reach of children.  Store at room temperature between 20 and 25 degrees C (68 and 77 degrees F). Throw away any unused medicine after the expiration date.  NOTE: This sheet  is a summary. It may not cover all possible information. If you have questions about this medicine, talk to your doctor, pharmacist, or health care provider.  © 2021 [employer]/Gold Standard (2019-09-19 09:19:36)

## 2021-09-02 NOTE — PROGRESS NOTES
I reviewed the patients evisit. Dx sinusitis. I sent augmentin and medrol to the patients pharmacy.I spent 11-20 minutes in the patient's chart.

## 2021-09-09 ENCOUNTER — E-VISIT (OUTPATIENT)
Dept: FAMILY MEDICINE CLINIC | Facility: TELEHEALTH | Age: 36
End: 2021-09-09

## 2021-09-09 DIAGNOSIS — B37.31 VAGINAL YEAST INFECTION: Primary | ICD-10-CM

## 2021-09-09 PROBLEM — E73.9 LACTOSE INTOLERANCE: Status: ACTIVE | Noted: 2017-01-20

## 2021-09-09 PROCEDURE — 99421 OL DIG E/M SVC 5-10 MIN: CPT | Performed by: NURSE PRACTITIONER

## 2021-09-09 RX ORDER — MELATONIN
COMMUNITY
Start: 2021-08-23 | End: 2022-09-18

## 2021-09-09 RX ORDER — FLUCONAZOLE 150 MG/1
TABLET ORAL
Qty: 2 TABLET | Refills: 0 | Status: SHIPPED | OUTPATIENT
Start: 2021-09-09 | End: 2021-09-17

## 2021-09-09 NOTE — PATIENT INSTRUCTIONS
Keep area clean and dry   If symptoms do not improve 2-3 days after second dose or worsen follow up with your primary care provider or urgent care for further evaluation and treatment.       Vaginal Yeast Infection, Adult    Vaginal yeast infection is a condition that causes vaginal discharge as well as soreness, swelling, and redness (inflammation) of the vagina. This is a common condition. Some women get this infection frequently.  What are the causes?  This condition is caused by a change in the normal balance of the yeast (candida) and bacteria that live in the vagina. This change causes an overgrowth of yeast, which causes the inflammation.  What increases the risk?  The condition is more likely to develop in women who:  · Take antibiotic medicines.  · Have diabetes.  · Take birth control pills.  · Are pregnant.  · Douche often.  · Have a weak body defense system (immune system).  · Have been taking steroid medicines for a long time.  · Frequently wear tight clothing.  What are the signs or symptoms?  Symptoms of this condition include:  · White, thick, creamy vaginal discharge.  · Swelling, itching, redness, and irritation of the vagina. The lips of the vagina (vulva) may be affected as well.  · Pain or a burning feeling while urinating.  · Pain during sex.  How is this diagnosed?  This condition is diagnosed based on:  · Your medical history.  · A physical exam.  · A pelvic exam. Your health care provider will examine a sample of your vaginal discharge under a microscope. Your health care provider may send this sample for testing to confirm the diagnosis.  How is this treated?  This condition is treated with medicine. Medicines may be over-the-counter or prescription. You may be told to use one or more of the following:  · Medicine that is taken by mouth (orally).  · Medicine that is applied as a cream (topically).  · Medicine that is inserted directly into the vagina (suppository).  Follow these instructions  at home:    Lifestyle  · Do not have sex until your health care provider approves. Tell your sex partner that you have a yeast infection. That person should go to his or her health care provider and ask if they should also be treated.  · Do not wear tight clothes, such as pantyhose or tight pants.  · Wear breathable cotton underwear.  General instructions  · Take or apply over-the-counter and prescription medicines only as told by your health care provider.  · Eat more yogurt. This may help to keep your yeast infection from returning.  · Do not use tampons until your health care provider approves.  · Try taking a sitz bath to help with discomfort. This is a warm water bath that is taken while you are sitting down. The water should only come up to your hips and should cover your buttocks. Do this 3-4 times per day or as told by your health care provider.  · Do not douche.  · If you have diabetes, keep your blood sugar levels under control.  · Keep all follow-up visits as told by your health care provider. This is important.  Contact a health care provider if:  · You have a fever.  · Your symptoms go away and then return.  · Your symptoms do not get better with treatment.  · Your symptoms get worse.  · You have new symptoms.  · You develop blisters in or around your vagina.  · You have blood coming from your vagina and it is not your menstrual period.  · You develop pain in your abdomen.  Summary  · Vaginal yeast infection is a condition that causes discharge as well as soreness, swelling, and redness (inflammation) of the vagina.  · This condition is treated with medicine. Medicines may be over-the-counter or prescription.  · Take or apply over-the-counter and prescription medicines only as told by your health care provider.  · Do not douche. Do not have sex or use tampons until your health care provider approves.  · Contact a health care provider if your symptoms do not get better with treatment or your symptoms go  away and then return.  This information is not intended to replace advice given to you by your health care provider. Make sure you discuss any questions you have with your health care provider.  Document Revised: 07/17/2020 Document Reviewed: 05/06/2019  Elsevier Patient Education © 2021 [employer].

## 2021-09-09 NOTE — PROGRESS NOTES
Temitope Moreno    1985  [id number]    I have reviewed the e-Visit questionnaire and patient's answers, my assessment and plan are as follows:      HPI  Temitope Moreno is a 35 y.o. with vaginal yeast infection. She developed symptoms after starting amoxicillin on 9/2.     Review of Systems - Genito-Urinary ROS: positive for - vulvar/vaginal symptoms  negative for - dysuria, genital ulcers, hematuria, pelvic pain or urinary frequency/urgency, foul odor of fever       Diagnoses and all orders for this visit:    1. Vaginal yeast infection (Primary)    Other orders  -     fluconazole (Diflucan) 150 MG tablet; Take one tab now and one when antibiotics are completed.  Dispense: 2 tablet; Refill: 0        Any medications prescribed have been sent electronically to   [facility]/pharmacy #[id number] - [place], [place] - [address] AT [facility] - [phone]  - [phone]   [address] [address]  Phone: [phone] Fax: [phone]      Time Documentation  Counseled patient  Counseling topics: diagnosis, treatment options, follow up plan and return instructions        MARILYN Dean  09/09/21  17:27 EDT

## 2021-11-29 ENCOUNTER — OFFICE VISIT (OUTPATIENT)
Dept: OBSTETRICS AND GYNECOLOGY | Facility: CLINIC | Age: 36
End: 2021-11-29

## 2021-11-29 VITALS
DIASTOLIC BLOOD PRESSURE: 80 MMHG | SYSTOLIC BLOOD PRESSURE: 122 MMHG | BODY MASS INDEX: 43.54 KG/M2 | WEIGHT: 255 LBS | HEIGHT: 64 IN

## 2021-11-29 DIAGNOSIS — F41.9 ANXIETY: ICD-10-CM

## 2021-11-29 DIAGNOSIS — E01.0 THYROMEGALY: ICD-10-CM

## 2021-11-29 DIAGNOSIS — Z01.419 WOMEN'S ANNUAL ROUTINE GYNECOLOGICAL EXAMINATION: Primary | ICD-10-CM

## 2021-11-29 DIAGNOSIS — E66.01 MORBID OBESITY WITH BMI OF 40.0-44.9, ADULT (HCC): ICD-10-CM

## 2021-11-29 PROCEDURE — 99395 PREV VISIT EST AGE 18-39: CPT | Performed by: OBSTETRICS & GYNECOLOGY

## 2021-11-29 NOTE — PROGRESS NOTES
GYN Annual Exam     CC - Here for annual exam.        HPI  Temitope Moreno is a 36 y.o. female, , who presents for annual well woman exam. Patient's last menstrual period was 2021..  Periods are regular every 25-35 days, lasting 5 days.  Dysmenorrhea:moderate, occurring first 1-2 days of flow.  Patient reports problems with: bruising on her legs since delivery.   There were no changes to her medical or surgical history since her last visit.. Partner Status: Marital Status: .  She is sexually active. She has not had new partners since her last STD testing. She does not desire STD testing. .    Additional OB/GYN History   Current contraception: contraceptive methods: Condoms  Desires to: do not start contraception  Last Pap :   Last Completed Pap Smear          Ordered - PAP SMEAR (Every 3 Years) Ordered on 2021  Done - negative              History of abnormal Pap smear: no  Family history of uterine, colon, breast, or ovarian cancer: no  Performs monthly Self-Breast Exam: no  Exercises Regularly:no  Feelings of Anxiety or Depression: yes - Zoloft controls well  Tobacco Usage?: No   OB History        3    Para   3    Term   3       0    AB   0    Living   3       SAB   0    IAB   0    Ectopic   0    Molar   0    Multiple   0    Live Births   3                Health Maintenance   Topic Date Due   • Annual Gynecologic Pelvic and Breast Exam  Never done   • ANNUAL PHYSICAL  Never done   • COVID-19 Vaccine (1) Never done   • INFLUENZA VACCINE  2021   • PAP SMEAR  2023   • TDAP/TD VACCINES (2 - Td or Tdap) 2031   • HEPATITIS C SCREENING  Completed   • Pneumococcal Vaccine 0-64  Aged Out       The additional following portions of the patient's history were reviewed and updated as appropriate: allergies, current medications, past family history, past medical history, past social history, past surgical history and problem list.    Review of Systems  "  Constitutional: Negative.    HENT: Negative.    Eyes: Negative.    Respiratory: Negative.    Cardiovascular: Negative.    Gastrointestinal: Negative.    Endocrine: Negative.    Genitourinary: Negative.    Musculoskeletal: Negative.    Skin: Positive for bruise.   Allergic/Immunologic: Negative.    Neurological: Negative.    Hematological: Negative.    Psychiatric/Behavioral: Negative.          I have reviewed and agree with the HPI, ROS, and historical information as entered above. Angy Forde MD    Objective   /80   Ht 162.6 cm (64\")   Wt 116 kg (255 lb)   LMP 11/22/2021   BMI 43.77 kg/m²     Physical Exam  Vitals and nursing note reviewed. Exam conducted with a chaperone present.   Constitutional:       Appearance: She is well-developed.   HENT:      Head: Normocephalic and atraumatic.   Neck:      Thyroid: Thyromegaly present. No thyroid mass.   Cardiovascular:      Rate and Rhythm: Normal rate and regular rhythm.      Heart sounds: No murmur heard.      Pulmonary:      Effort: Pulmonary effort is normal. No retractions.      Breath sounds: Normal breath sounds. No wheezing, rhonchi or rales.   Chest:      Chest wall: No mass or tenderness.   Breasts:      Right: Normal. No mass, nipple discharge, skin change or tenderness.      Left: Normal. No mass, nipple discharge, skin change or tenderness.       Abdominal:      General: Bowel sounds are normal.      Palpations: Abdomen is soft. Abdomen is not rigid. There is no mass.      Tenderness: There is no abdominal tenderness. There is no guarding.      Hernia: No hernia is present. There is no hernia in the left inguinal area or right inguinal area.   Genitourinary:     General: Normal vulva.      Exam position: Lithotomy position.      Pubic Area: No rash.       Labia:         Right: No rash, tenderness or lesion.         Left: No rash, tenderness or lesion.       Urethra: No urethral pain or urethral swelling.      Vagina: Normal. No vaginal " discharge or lesions.      Cervix: No cervical motion tenderness, discharge, lesion or cervical bleeding.      Uterus: Normal. Not enlarged, not fixed and not tender.       Adnexa:         Right: No mass, tenderness or fullness.          Left: No mass, tenderness or fullness.        Rectum: No external hemorrhoid.   Musculoskeletal:      Cervical back: Normal range of motion. No muscular tenderness.   Neurological:      Mental Status: She is alert and oriented to person, place, and time.   Psychiatric:         Behavior: Behavior normal.            Assessment and Plan    Problem List Items Addressed This Visit        Endocrine and Metabolic    Morbid obesity with BMI of 40.0-44.9, adult ([facility])       Mental Health    Anxiety    Overview     On Zoloft, feels like it needs to be increased.  Will increase to 100 mg daily         Relevant Medications    sertraline (Zoloft) 100 MG tablet      Other Visit Diagnoses     Women's annual routine gynecological examination    -  Primary    Relevant Orders    Pap IG, HPV-hr    Thyromegaly        Relevant Orders    TSH+Free T4    Ambulatory Referral to Endocrinology          1. GYN annual well woman exam.   2. Encouraged use of condoms for STD prevention.  3. Reviewed monthly self breast exams.  Instructed to call with lumps, pain, or breast discharge.    4. Reviewed BMI and weight loss as preventative health measures.   5. Reviewed exercise as a preventative health measures.   6. Recommended use of Vitamin D replacement and getting adequate calcium in her diet. (1500mg)  7. Reccommended Flu Vaccine in Fall of each year.  8. RTC in 1 year or PRN with problems  Return in about 1 year (around 11/29/2022) for Annual physical.      Angy Forde MD  11/29/2021

## 2021-11-30 LAB
T4 FREE SERPL-MCNC: 0.93 NG/DL (ref 0.93–1.7)
TSH SERPL DL<=0.005 MIU/L-ACNC: 0.89 UIU/ML (ref 0.27–4.2)

## 2021-12-07 DIAGNOSIS — Z01.419 WOMEN'S ANNUAL ROUTINE GYNECOLOGICAL EXAMINATION: ICD-10-CM

## 2022-02-10 ENCOUNTER — OFFICE VISIT (OUTPATIENT)
Dept: ENDOCRINOLOGY | Facility: CLINIC | Age: 37
End: 2022-02-10

## 2022-02-10 VITALS
BODY MASS INDEX: 43.71 KG/M2 | HEART RATE: 81 BPM | SYSTOLIC BLOOD PRESSURE: 116 MMHG | OXYGEN SATURATION: 98 % | WEIGHT: 256 LBS | DIASTOLIC BLOOD PRESSURE: 80 MMHG | HEIGHT: 64 IN

## 2022-02-10 DIAGNOSIS — E04.2 MULTIPLE THYROID NODULES: ICD-10-CM

## 2022-02-10 DIAGNOSIS — E01.0 THYROMEGALY: Primary | ICD-10-CM

## 2022-02-10 PROCEDURE — 76536 US EXAM OF HEAD AND NECK: CPT | Performed by: INTERNAL MEDICINE

## 2022-02-10 PROCEDURE — 99203 OFFICE O/P NEW LOW 30 MIN: CPT | Performed by: INTERNAL MEDICINE

## 2022-02-10 NOTE — PROGRESS NOTES
"     Office Note      Date: 02/10/2022  Patient Name: Temitope Moreno  MRN: [id number]  : 1985    Chief Complaint   Patient presents with   • Thyromegaly       History of Present Illness:   Temitope Moreno is a 36 y.o. female who presents for Thyromegaly    She denies any prior h/o thyroid disease.  She saw her Gyn last fall for routine check up.  She was thought to have goiter on exam.  She had labs done and TSH was normal at 0.886.  Free T4 was normal as well.  She hasn't noted any change in the size of her neck.  She denies any compressive sxs.  She notes fatigue.  She notes hair loss.  She hasn't taken thyroid meds.  She denies any biotin use.      Subjective      Patient was born where: [place].  Facial radiation exposure: No.  High iodine intake: No  Family hx of thyroid disease: Yes, describe: MGM?.    Review of Systems:   Review of Systems   Constitutional: Positive for fatigue.   HENT: Negative.    Eyes: Negative.    Respiratory: Negative.    Cardiovascular: Negative.    Gastrointestinal: Positive for blood in stool and vomiting.        Heartburn/Reflux   Endocrine: Negative.    Genitourinary: Negative.    Musculoskeletal: Negative.    Skin: Negative.         Hair Loss   Allergic/Immunologic: Negative.    Neurological: Negative.    Hematological: Negative.    Psychiatric/Behavioral: Negative.        The following portions of the patient's history were reviewed and updated as appropriate: allergies, current medications, past family history, past medical history, past social history, past surgical history and problem list.    Objective     Visit Vitals  /80 (BP Location: Left arm, Patient Position: Sitting, Cuff Size: Adult)   Pulse 81   Ht 162.6 cm (64\")   Wt 116 kg (256 lb)   SpO2 98%   BMI 43.94 kg/m²       Physical Exam:  Physical Exam  Constitutional:       Appearance: Normal appearance.   HENT:      Head: Normocephalic and atraumatic.   Eyes:      Extraocular Movements: Extraocular movements " intact.      Conjunctiva/sclera: Conjunctivae normal.      Pupils: Pupils are equal, round, and reactive to light.   Neck:      Thyroid: Thyromegaly present. No thyroid tenderness.      Comments: ~2cm enlargement in left thyroid lobe.  Cardiovascular:      Rate and Rhythm: Normal rate and regular rhythm.      Pulses: Normal pulses.      Heart sounds: Normal heart sounds.   Pulmonary:      Effort: Pulmonary effort is normal.      Breath sounds: Normal breath sounds.   Abdominal:      General: Bowel sounds are normal.      Palpations: Abdomen is soft.   Musculoskeletal:         General: Normal range of motion.      Cervical back: Normal range of motion and neck supple.   Lymphadenopathy:      Cervical: No cervical adenopathy.   Skin:     General: Skin is warm and dry.   Neurological:      General: No focal deficit present.      Mental Status: She is alert.   Psychiatric:         Mood and Affect: Mood normal.         Behavior: Behavior normal.         Thought Content: Thought content normal.         Judgment: Judgment normal.         Labs:    TSH  No results found for: TSHBASE     Free T4  Free T4   Date Value Ref Range Status   11/29/2021 0.93 0.93 - 1.70 ng/dL Final     Comment:     Results may be falsely increased if patient taking Biotin.       T3  No results found for: [id number]      TPO  No results found for: THYROIDAB    TG AB  No results found for: THGAB    TG  No results found for: THYROGLB    CBC w/DIFF  Lab Results   Component Value Date    WBC 10.83 (H) 02/26/2021    RBC 3.97 02/26/2021    HGB 12.1 02/26/2021    HCT 39.2 02/26/2021    MCV 98.7 (H) 02/26/2021    MCH 30.5 02/26/2021    MCHC 30.9 (L) 02/26/2021    RDW 13.2 02/26/2021    RDWSD 46.7 02/26/2021    MPV 10.9 02/26/2021     02/26/2021    NEUTRORELPCT 72.5 02/26/2021    LYMPHORELPCT 16.8 (L) 02/26/2021    MONORELPCT 7.8 02/26/2021    EOSRELPCT 1.9 02/26/2021    BASORELPCT 0.4 02/26/2021    AUTOIGPER 0.6 (H) 02/26/2021    NEUTROABS 7.84 (H)  02/26/2021    LYMPHSABS 1.82 02/26/2021    MONOSABS 0.85 02/26/2021    EOSABS 0.21 02/26/2021    BASOSABS 0.04 02/26/2021    AUTOIGNUM 0.07 (H) 02/26/2021    NRBC 0.0 02/26/2021           Assessment / Plan      Assessment & Plan:  Diagnoses and all orders for this visit:    1. Thyromegaly (Primary)  Assessment & Plan:  She was thought to have thyromegaly on recent neck exam.  She is euthyroid.    A neck u/s was performed today.  This revealed a 2.9cm cyst in the left thyroid lobe.  There were small cysts noted in the isthmus and right thyroid lobe as well.  No abnormal lymph nodes were seen.    We discussed the diagnosis and low likelihood of malignancy.  No need for FNA at this time.  Plan for another u/s in a year.    Orders:  -     US Thyroid    2. Multiple thyroid nodules       Return in about 6 months (around 8/10/2022) for Recheck with TSH.    Tyron Max MD   02/10/2022

## 2022-02-10 NOTE — ASSESSMENT & PLAN NOTE
She was thought to have thyromegaly on recent neck exam.  She is euthyroid.    A neck u/s was performed today.  This revealed a 2.9cm cyst in the left thyroid lobe.  There were small cysts noted in the isthmus and right thyroid lobe as well.  No abnormal lymph nodes were seen.    We discussed the diagnosis and low likelihood of malignancy.  No need for FNA at this time.  Plan for another u/s in a year.

## 2022-05-13 DIAGNOSIS — F41.9 ANXIETY: ICD-10-CM

## 2022-05-13 RX ORDER — SERTRALINE HYDROCHLORIDE 100 MG/1
TABLET, FILM COATED ORAL
Qty: 90 TABLET | Refills: 0 | Status: SHIPPED | OUTPATIENT
Start: 2022-05-13 | End: 2022-09-18

## 2022-07-11 ENCOUNTER — E-VISIT (OUTPATIENT)
Dept: FAMILY MEDICINE CLINIC | Facility: TELEHEALTH | Age: 37
End: 2022-07-11

## 2022-07-11 PROCEDURE — BRIGHTMDVISIT: Performed by: NURSE PRACTITIONER

## 2022-07-11 NOTE — EXTERNAL PATIENT INSTRUCTIONS
Diagnosis   Yeast infection (vulvovaginal candidiasis)   My name is Annelise Fiore, and I'm a healthcare provider at [facility]. Based on your interview, I see you have a yeast infection. A yeast infection isn't considered a sexually transmitted infection (STI).   Medications   Your pharmacy   [id number] [facility] [id number] [phone]     Prescription   Fluconazole (150mg): Take 1 tablet by mouth once for 1 day as a single dose. If symptoms are still present after 3 days, you may take a second tablet.   About your diagnosis   Your vagina naturally and normally contains a balance of yeast and bacteria. When this balance is upset, an overgrowth of yeast can occur, causing the symptoms of a yeast infection. Antibiotic use, hormonal changes, poorly controlled diabetes, and stress can all affect the balance of organisms in the vagina.   Vaginal yeast infections are very common. In fact, about 3 in 4 women will have a yeast infection at some point in their lives. Yeast infection symptoms aren't usually serious.   What to expect   If you follow this treatment plan, you should feel better within 1 week.   When to seek care   Call us at [phone]   with any sudden or unexpected symptoms.    Symptoms that change or get worse.    Symptoms that don't go away even after completing your treatment plan.    A fever of 101F or higher.    Vaginal discharge with an unusual odor.    Frothy or foamy vaginal discharge.    Green or yellow vaginal discharge.    Spotting or bleeding unrelated to your period.    Severe abdominal cramping or pain.    Sores on your genital area.    Vomiting.   Other treatment   For vaginal itching or burning, apply a cold compress or washcloth.   Prevention    Wear cotton underwear. If you wear pantyhose or tights, choose brands with a cotton crotch.    Wear loose-fitting clothes made from natural fibers.    Change out of workout clothes right after  exercising.    After bathing, dry off completely before you get dressed.    Always wipe from front to back when you use the toilet.    Use only unscented and dye-free toilet paper.    Consider sleeping without underwear.    Use only unscented sanitary pads or tampons.    It's best to avoid douching products. Douching can increase the risk of vaginal infections, such as yeast infections.   Your provider   Your diagnosis was provided by Annelise Fiore, a member of your trusted care team at [facility].   If you have any questions, call us at [phone]  .

## 2022-07-11 NOTE — E-VISIT TREATED
Chief Complaint: Yeast infection   Patient introduction   Patient is 36-year-old female presenting with 1 to 3 days of vulvar pruritus, vaginal pruritus, and change in vaginal discharge.   General presentation   Describes vaginal discharge as thick and clumpy and white.   No genital erythema, genital edema, or darkening of genital skin. No pain in or around vagina. No vaginal dryness. Symptom onset was after menses. No vaginal bleeding or spotting unrelated to menstruation.   Sexually active in the past 90 days. No new sex partner in previous 2 weeks. Did not receive STI treatment within the past 3 months.   No hormonal medication. Did not recently use douching products. Did not recently use a product containing nonoxynol-9. No new or recent use of possible irritants. Did not take antibiotics in the last 2 weeks.   Positive history of vulvovaginal candidiasis with 1 to 3 episodes in the previous 12 months. Current symptoms are similar to previous episodes of vulvovaginal candidiasis.   Has tried an OTC topical yeast infection treatment for current symptoms.   No history of treatment for bacterial vaginosis.   Review of red flags/alarm symptoms:    No frothy or foamy vaginal discharge    No green vaginal discharge    No genital trauma    No genital sores    No abdominal or pelvic surgery within the last month    No fever    No vomiting    No abdominal pain    No retained foreign object in vagina    No treatment for an STI in the last 3 months    No sexual partner tested positive for an STI   Pregnancy/menstrual status/breastfeeding:   Not pregnant. Not breastfeeding. Regarding last menstrual period, patient writes: None.   Preferred medication route:   Prefers oral treatment option.   Current medications   Not taking other medications or supplements.   Medication allergies   None.   Medication contraindication review   None.   Past medical history   No diabetes mellitus.   Immune conditions: No immunocompromising  conditions. No history of cancer.   Social history   Never smoked tobacco.   Assessment   Vulvovaginal candidiasis.   This is the likely diagnosis based on patient's interview responses, including:    Vulvar pruritus    Vaginal pruritus    Thick, clumpy white vaginal discharge    Prior diagnosis of vulvovaginal candidiasis with similar symptoms   Plan   Medications:    fluconazole 150 mg tablet RX 150mg 1 tab PO once 1d as a single dose for vaginal yeast infection. Repeat in 72 hours if symptoms persist. Amount is 2 tab.   The patient's prescription will be sent to:   [facility]/pharmacy #[id number] [facility] [address]   Phone: [phone]     Fax: [phone]   Education:    Condition and causes    Prevention    Treatment and self-care    When to call provider   Follow-up:   Patient to follow up as needed for progression or lack of improvement in symptoms within 7 to 10 days.   ----------   Electronically signed by MARILYN Knowles on 2022-07-11 at 15:59PM   ----------   Patient Interview Transcript:   Which of these symptoms are bothering you? Select all that apply.    Itching around my vagina    Itching in my vagina    Vaginal discharge that looks different than usual   Not selected:    Burning around my vagina    Burning in my vagina    Fishy-smelling vaginal discharge    Pain during sex    Burning with urination    None of the above   How long have you had these symptoms? Select one.    1 to 3 days   Not selected:    Less than 24 hours    4 to 7 days    More than 7 days   How would you describe your vaginal discharge? Select one.    Thick and clumpy, like cottage cheese   Not selected:    Thin    Frothy or foamy    None of the above   What color is your vaginal discharge? Select one.    White   Not selected:    Gray or off-white    Yellow    Green    None of the above   Do you have any vaginal dryness? Select one.    No   Not selected:    Yes   Have you had any unexpected vaginal  bleeding or spotting? By unexpected, we mean either between your normal periods or after you've gone through menopause. Select one.    No   Not selected:    Yes   Is there any redness, swelling, or darkening of the skin in or around your vagina? Select all that apply.    None of the above   Not selected:    Redness    Swelling    Darkening of the skin    I'm not sure   Do you have any pain in or around your vagina? Select one.    No   Not selected:    Yes   Since your symptoms started, have you used a vaginal health screening kit to check the acidity (pH) of your vaginal discharge? These kits are available without a prescription at many drug stores and pharmacies. Common brands include Monistat Trak Care Vaginal Health Test and Vagisil Screening Kit. Select one.    No   Not selected:    Yes   Have you noticed any sores on your genital area? This includes blisters or ulcers. Select one.    No   Not selected:    Yes   Along with your vaginal symptoms, have you had any of these symptoms? Select all that apply.    None of the above   Not selected:    Fever    Vomiting    Abdominal (stomach) pain   Before your symptoms began, did you have an injury to your genital area or vagina? Select one.    No   Not selected:    Yes   Could an object like a tampon or condom be stuck inside your vagina? Select one.    No   Not selected:    Yes   In the 2 weeks before your symptoms began, did you use any douching products? Select one.    No   Not selected:    Yes   In the 2 weeks before your symptoms began, did you use any products containing nonoxynol-9? Nonoxynol-9 is a spermicide commonly found in birth control products, including condoms, sponges, vaginal films, and jellies. Select one.    No   Not selected:    Yes   In the week before your symptoms started, did any of these come into contact with your genital area? Select all that apply.    None of the above   Not selected:    New soap    Underwear washed with a new laundry  detergent    New sex toy    New cream or lotion    New medication    New perfume    New feminine or flushable wipe    Other new product (specify)   Have you had a yeast infection before? Select one.    Yes, and my current symptoms feel the same as before   Not selected:    Yes, but my current symptoms feel different than before    No   How many yeast infections have you had in the last 12 months? Select one.    1 to 3   Not selected:    0    4 or more   Have you ever been treated for bacterial vaginosis (BV)? Select one.    No   Not selected:    Yes   In the past, have you developed yeast infections as a result of taking oral antibiotics? Select one.    Yes   Not selected:    No   Were you sexually active at any time in the last 3 months? Select one.    Yes   Not selected:    No   Have you had sex with a new partner in the last 2 weeks? Select one.    No   Not selected:    Yes   Have you had sex with 3 or more partners in the last 3 months? Select one.    No   Not selected:    Yes   In the last 3 months, were you treated for a sexually transmitted infection (STI)? Examples of STIs include chlamydia, gonorrhea, trichomoniasis (trich), herpes, or syphilis. Select one.    No   Not selected:    Yes   Has your sexual partner(s) recently tested positive for a sexually transmitted infection (STI)? Select all that apply.    No, not that I know of   Not selected:    Yes, chlamydia    Yes, gonorrhea    Yes, trichomoniasis (trich)    Other (specify)   Are you pregnant? Select one.    No   Not selected:    Yes   When was your last menstrual period? If you don't currently have periods or no longer have periods, please briefly explain.    June 22   Are you breastfeeding? Select one.    No   Not selected:    Yes   Did your symptoms start before, during, or after your menstrual period? Select one.    After   Not selected:    Before    During    I don't currently have periods or no longer have periods    I'm not sure   Have you  "recently had abdominal or pelvic surgery? Select one.    No   Not selected:    Yes, within the last month    Yes, within the last 3 months   Are you currently being treated for Type 1 or Type 2 diabetes? Select one.    No   Not selected:    Yes   Do you have any of these conditions that can affect the immune system? Scroll to see all options. Select all that apply.    None of these   Not selected:    History of bone marrow transplant    Chronic kidney disease    Chronic liver disease (including cirrhosis)    HIV/AIDS    Inflammatory bowel disease (Crohn's disease or ulcerative colitis)    Lupus    Moderate to severe plaque psoriasis    Multiple sclerosis    Rheumatoid arthritis    Sickle cell anemia    Alpha or beta thalassemia    History of solid organ transplant (kidney, liver, or heart)    History of spleen removal    An autoimmune disorder not listed here    A condition requiring treatment with long-term use of oral steroids (such as prednisone, prednisolone, or dexamethasone)   Have you ever been diagnosed with cancer? Select one.    No   Not selected:    Yes, I have cancer now    Yes, but I'm in remission   Have you been treated for antibiotic-associated colitis in the last month? This is also called \"C. diff colitis.\" It's commonly caused by the bacteria Clostridium difficile. Select one.    No   Not selected:    Yes   Have you ever been diagnosed with the heart condition known as prolonged QT interval or QT prolongation? Select one.    No   Not selected:    Yes   Do you smoke tobacco? Select one.    No, never   Not selected:    Yes, every day    Yes, some days    No, I quit   Have you tried any of these over-the-counter treatments for your current symptoms? Select all that apply.    Vaginal cream, ointment, or suppository for yeast infection   Not selected:    Anti-itch cream or ointment containing hydrocortisone    Vaginal wash    Vaginal wipes, such as Summer's Malina    Homeopathic treatment    Other " (specify)    I haven't tried anything for my symptoms   Do you take or use any of these medications containing estrogen or progesterone? Select one.    None of the above   Not selected:    Birth control pills    Hormonal intrauterine device (IUD)    Contraceptive patch    Vaginal ring, such as NuvaRing    Birth control shot, such as Depo-Provera    Hormone replacement therapy (HRT), such as oral and topical medication, vaginal ring, and transdermal patch   In the last 2 weeks, have you taken oral antibiotics? Oral antibiotics are medications that you take by mouth to treat a bacterial infection. Select one.    No   Not selected:    Yes   Are you taking any other medications or supplements? On the next screen, you need to list all vitamins, supplements, non-prescription medications (such as aspirin or Aleve), and prescription medications that you're taking. Select one.    No   Not selected:    Yes    Yes, but I'm not sure what they are   Have you ever had an allergic or bad reaction to any medication? Select one.    No   Not selected:    Yes   Have you used the medication disulfiram (Antabuse) in the last 2 weeks? Select one.    No   Not selected:    Yes   If medication is needed, would you prefer oral or vaginal medication? - Oral medications are pills that you swallow. - Vaginal medications are gels, creams, ointments, or suppositories that are placed in the vagina. We'll try to provide medication in the form you prefer, but that's not always possible. Select one.    Oral   Not selected:    Vaginal    No preference   Is there anything else you'd like to tell us about your symptoms?   The patient did not enter any additional information.   ----------   Medical history   Medical history data does not currently exist for this patient.

## 2022-08-17 ENCOUNTER — OFFICE VISIT (OUTPATIENT)
Dept: ENDOCRINOLOGY | Facility: CLINIC | Age: 37
End: 2022-08-17

## 2022-08-17 VITALS
DIASTOLIC BLOOD PRESSURE: 78 MMHG | BODY MASS INDEX: 44.39 KG/M2 | HEART RATE: 64 BPM | HEIGHT: 64 IN | OXYGEN SATURATION: 98 % | SYSTOLIC BLOOD PRESSURE: 118 MMHG | WEIGHT: 260 LBS

## 2022-08-17 DIAGNOSIS — E04.2 MULTIPLE THYROID NODULES: Primary | ICD-10-CM

## 2022-08-17 PROCEDURE — 99213 OFFICE O/P EST LOW 20 MIN: CPT | Performed by: INTERNAL MEDICINE

## 2022-08-17 NOTE — PROGRESS NOTES
"     Office Note      Date: 2022  Patient Name: Temitope Moreno  MRN: [id number]  : 1985    Chief Complaint   Patient presents with   • thyromegaly       History of Present Illness:   Temitope Moreno is a 36 y.o. female who presents for thyromegaly    She has been euthyroid.  She hasn't noted any change in the size of her neck.  She denies any compressive sxs.  She notes fatigue.  She notes hair loss.  She hasn't taken thyroid meds.  She denies any biotin use.      At the last visit 6 months ago, neck u/s was performed which revealed multiple thyroid cysts.  Largest in the left lobe was 2.9cm.    She had labs done last month.  The TSH was 1.18.  TPO ab was negative.      Subjective      Review of Systems:   Review of Systems   Constitutional: Positive for fatigue.   Cardiovascular: Negative.    Gastrointestinal: Negative.    Endocrine: Negative.        The following portions of the patient's history were reviewed and updated as appropriate: allergies, current medications, past family history, past medical history, past social history, past surgical history and problem list.    Objective     Visit Vitals  /78   Pulse 64   Ht 162.6 cm (64\")   Wt 118 kg (260 lb)   SpO2 98%   BMI 44.63 kg/m²       Physical Exam:  Physical Exam  Constitutional:       Appearance: Normal appearance.   Neck:      Thyroid: Thyroid mass present. No thyromegaly or thyroid tenderness.      Comments: ~2cm nodule low in left lobe - freely movable  Lymphadenopathy:      Cervical: No cervical adenopathy.   Neurological:      Mental Status: She is alert.         Labs:    TSH  No results found for: TSHBASE     Free T4  Free T4   Date Value Ref Range Status   2021 0.93 0.93 - 1.70 ng/dL Final     Comment:     Results may be falsely increased if patient taking Biotin.       T3  No results found for: C9BNPZJ      TPO  No results found for: THYROIDAB    TG AB  No results found for: THGAB    TG  No results found for: " THYROGLB    CBC w/DIFF  Lab Results   Component Value Date    WBC 10.83 (H) 02/26/2021    RBC 3.97 02/26/2021    HGB 12.1 02/26/2021    HCT 39.2 02/26/2021    MCV 98.7 (H) 02/26/2021    MCH 30.5 02/26/2021    MCHC 30.9 (L) 02/26/2021    RDW 13.2 02/26/2021    RDWSD 46.7 02/26/2021    MPV 10.9 02/26/2021     02/26/2021    NEUTRORELPCT 72.5 02/26/2021    LYMPHORELPCT 16.8 (L) 02/26/2021    MONORELPCT 7.8 02/26/2021    EOSRELPCT 1.9 02/26/2021    BASORELPCT 0.4 02/26/2021    AUTOIGPER 0.6 (H) 02/26/2021    NEUTROABS 7.84 (H) 02/26/2021    LYMPHSABS 1.82 02/26/2021    MONOSABS 0.85 02/26/2021    EOSABS 0.21 02/26/2021    BASOSABS 0.04 02/26/2021    AUTOIGNUM 0.07 (H) 02/26/2021    NRBC 0.0 02/26/2021           Assessment / Plan      Assessment & Plan:  Diagnoses and all orders for this visit:    1. Multiple thyroid nodules (Primary)  Assessment & Plan:  Stable to palpation.  Plan for neck u/s again in 6 months.    She remains euthyroid.        Return in about 6 months (around 2/17/2023) for Recheck with TSH, neck u/s.    Tyron Max MD   08/17/2022

## 2022-09-18 ENCOUNTER — E-VISIT (OUTPATIENT)
Dept: ADMINISTRATIVE | Facility: OTHER | Age: 37
End: 2022-09-18

## 2022-09-18 ENCOUNTER — TELEMEDICINE (OUTPATIENT)
Dept: FAMILY MEDICINE CLINIC | Facility: TELEHEALTH | Age: 37
End: 2022-09-18

## 2022-09-18 DIAGNOSIS — H92.02 OTALGIA OF LEFT EAR: Primary | ICD-10-CM

## 2022-09-18 PROCEDURE — 99213 OFFICE O/P EST LOW 20 MIN: CPT | Performed by: NURSE PRACTITIONER

## 2022-09-18 RX ORDER — FLUCONAZOLE 150 MG/1
150 TABLET ORAL ONCE
Qty: 2 TABLET | Refills: 0 | Status: SHIPPED | OUTPATIENT
Start: 2022-09-18 | End: 2022-09-18

## 2022-09-18 RX ORDER — AMOXICILLIN 875 MG/1
875 TABLET, COATED ORAL 2 TIMES DAILY
Qty: 20 TABLET | Refills: 0 | Status: SHIPPED | OUTPATIENT
Start: 2022-09-18 | End: 2022-09-28

## 2022-09-18 RX ORDER — FLUTICASONE PROPIONATE 50 MCG
2 SPRAY, SUSPENSION (ML) NASAL DAILY
Qty: 16 G | Refills: 0 | Status: SHIPPED | OUTPATIENT
Start: 2022-09-18 | End: 2023-02-22

## 2022-09-18 NOTE — E-VISIT ESCALATED
Chief Complaint: Ear pain   Patient was shown the following escalation message:   Some conditions need a visit with a healthcare provider as soon as possible   The outside of your ear is red, swollen, warm, and painful to the touch. This may suggest a more complicated condition. Visit an urgent care clinic, or make an appointment to be seen in the next 24 hours.   ----------   Patient Interview Transcript:   How would you describe your ear pain or discomfort? Select all that apply.    Achy    Blocked or plugged    Full    Pressure    Throbbing    Shooting/stabbing/sharp   Not selected:    Itchy    Crackling or popping   On a scale of 1 to 10, how severe is your ear pain? Select one.    Moderate to severe (7 to 9); it's intense   Not selected:    Mild (1 to 3); it bothers me a little bit    Moderate (4 to 6); it's pretty uncomfortable    Very severe; it's unbearable (10+)   Which ear is affected? Select one.    My left ear   Not selected:    My right ear    Both of my ears   When did you first notice this ear pain or discomfort? Select one.    Yesterday   Not selected:    Today    2 to 3 days ago    4 to 5 days ago    More than 5 days ago   Did your ear pain or discomfort come on suddenly or over time? Select one.    Suddenly   Not selected:    Over time    I'm not sure   Is the outside of the affected ear red, swollen, warm to the touch, or painful to the touch? Select all that apply.    Red    Swollen    Warm to the touch    Painful to the touch   Not selected:    None of these   ----------   Medical history   Medical history data does not currently exist for this patient.

## 2022-09-18 NOTE — PROGRESS NOTES
You have chosen to receive care through a telehealth visit.  Do you consent to use a video/audio connection for your medical care today? Yes     CHIEF COMPLAINT  Chief Complaint   Patient presents with   • Earache         HPI  Temitope Moreno is a 36 y.o. female  presents with complaint of ear pain. Reports symptoms started yesterday. Reports the pain woke her up this morning. Reports the pain is deep in her ear. Denies any fever or chills. Denies any nausea or vomiting. Denies any yellow or bloody drainage from the canal. Denies recent swimming. Reports she has taken IBU last night for the pain.     Review of Systems   Constitutional: Negative for chills, fatigue and fever.   HENT: Positive for ear pain. Negative for congestion, ear discharge, sinus pressure, sinus pain and sore throat.    Respiratory: Negative for cough, chest tightness, shortness of breath and wheezing.    Cardiovascular: Negative for chest pain.   Gastrointestinal: Negative for abdominal pain, diarrhea, nausea and vomiting.   Musculoskeletal: Negative for back pain and myalgias.   Neurological: Negative for dizziness and headaches.   Psychiatric/Behavioral: Negative.        Past Medical History:   Diagnosis Date   • Allergic    • Depression     postpartum, pretreating   • Goiter 2022   • History of ear infections    • History of strep sore throat    • Obesity    • Pregnancy     OTHER THAN FIRST   •  (spontaneous vaginal delivery)    • Urinary tract infection    • Vitamin D deficiency 2016   • Yeast infection     CHRONIC       Family History   Problem Relation Age of Onset   • Cancer Mother         pancreatic   • Obesity Mother    • Hypertension Mother    • Pancreatic cancer Other    • Clotting disorder Other    • Cervical cancer Other    • Diabetes Other    • Heart disease Other    • Hyperlipidemia Other    • Melanoma Other    • Hemochromatosis Other    • Stroke Paternal Grandmother    • Diabetes Paternal Grandmother    • Obesity  Paternal Grandmother    • Diabetes Maternal Grandmother    • Obesity Maternal Grandmother    • Hypertension Maternal Grandfather    • Obesity Maternal Grandfather    • Obesity Father        Social History     Socioeconomic History   • Marital status:    Tobacco Use   • Smoking status: Never Smoker   • Smokeless tobacco: Never Used   Vaping Use   • Vaping Use: Never used   Substance and Sexual Activity   • Alcohol use: Yes     Comment: Occasional social drinker 1-2 servings/month max   • Drug use: No   • Sexual activity: Yes     Partners: Male     Birth control/protection: Condom, Coitus interruptus, None, Natural family planning       Temitope Moreno  reports that she has never smoked. She has never used smokeless tobacco.. I have educated her on the risk of diseases from using tobacco products such as cancer, COPD and heart disease.         I spent 1 minutes counseling the patient.              LMP 09/04/2022   Breastfeeding No     PHYSICAL EXAM  Physical Exam   Constitutional: She is oriented to person, place, and time. She appears well-developed and well-nourished. No distress.   HENT:   Head: Normocephalic and atraumatic.   Right Ear: Hearing and external ear normal. No swelling. No mastoid tenderness.   Left Ear: No drainage, swelling or tenderness. No mastoid tenderness. Decreased hearing is noted.   Mouth/Throat: Mouth/Lips are normal.Oropharynx is clear and moist.   Patient directed exam   Eyes: Conjunctivae and lids are normal.   Pulmonary/Chest: Effort normal.  No respiratory distress.  Lymphadenopathy:        Head (right side): No posterior auricular adenopathy present.        Head (left side): No posterior auricular adenopathy present.        Right cervical: No anterior cervical adenopathy present.       Left cervical: No anterior cervical adenopathy present.   Neurological: She is alert and oriented to person, place, and time. She has normal strength.   Psychiatric: She has a normal mood and  affect. Her speech is normal and behavior is normal.       Results for orders placed or performed in visit on 11/29/21   TSH+Free T4    BLOOD   Result Value Ref Range    TSH 0.886 0.270 - 4.200 uIU/mL    Free T4 0.93 0.93 - 1.70 ng/dL       Diagnoses and all orders for this visit:    1. Otalgia of left ear (Primary)    Other orders  -     amoxicillin (AMOXIL) 875 MG tablet; Take 1 tablet by mouth 2 (Two) Times a Day for 10 days.  Dispense: 20 tablet; Refill: 0  -     fluticasone (Flonase) 50 MCG/ACT nasal spray; 2 sprays into the nostril(s) as directed by provider Daily for 30 days.  Dispense: 16 g; Refill: 0  -     fluconazole (Diflucan) 150 MG tablet; Take 1 tablet by mouth 1 (One) Time for 1 dose. May repeat in 7 days if symptoms persist  Dispense: 2 tablet; Refill: 0    IBU for pain control  Take medications as prescribed  PCP if symptoms persist  ER for worsening symptoms such as high fever, chest pain or SOA       FOLLOW-UP  As discussed during visit with PCP/[facility] if no improvement or Urgent Care/Emergency Department if worsening of symptoms    Patient verbalizes understanding of medication dosage, comfort measures, instructions for treatment and follow-up.    MARILYN Romero  09/18/2022  05:34 EDT    The use of a video visit has been reviewed with the patient and verbal informed consent has been obtained. Myself and Temitope Moreno participated in this visit. The patient is located in [address] [place] [address].    I am located in [place], [place]. Mychart and Zoom were utilized. I spent 5 minutes in the patient's chart for this visit.

## 2023-02-22 ENCOUNTER — OFFICE VISIT (OUTPATIENT)
Dept: ENDOCRINOLOGY | Facility: CLINIC | Age: 38
End: 2023-02-22
Payer: COMMERCIAL

## 2023-02-22 VITALS
BODY MASS INDEX: 44.9 KG/M2 | DIASTOLIC BLOOD PRESSURE: 80 MMHG | HEART RATE: 71 BPM | HEIGHT: 64 IN | WEIGHT: 263 LBS | OXYGEN SATURATION: 97 % | SYSTOLIC BLOOD PRESSURE: 112 MMHG

## 2023-02-22 DIAGNOSIS — E04.2 MULTIPLE THYROID NODULES: Primary | ICD-10-CM

## 2023-02-22 LAB — TSH SERPL DL<=0.05 MIU/L-ACNC: 0.96 UIU/ML (ref 0.27–4.2)

## 2023-02-22 PROCEDURE — 99213 OFFICE O/P EST LOW 20 MIN: CPT | Performed by: INTERNAL MEDICINE

## 2023-02-22 PROCEDURE — 76536 US EXAM OF HEAD AND NECK: CPT | Performed by: INTERNAL MEDICINE

## 2023-02-22 PROCEDURE — 84443 ASSAY THYROID STIM HORMONE: CPT | Performed by: INTERNAL MEDICINE

## 2023-02-22 RX ORDER — CETIRIZINE HYDROCHLORIDE 10 MG/1
10 TABLET ORAL DAILY
COMMUNITY

## 2023-02-22 NOTE — PROGRESS NOTES
"     Office Note      Date: 2023  Patient Name: Temitope Moreno  MRN: [id number]  : 1985    Chief Complaint   Patient presents with   • Multiple thyroid nodules       History of Present Illness:   Temitope Moreno is a 37 y.o. female who presents for Multiple thyroid nodules    She has been euthyroid.  She hasn't noted any change in the size of her neck.  She denies any compressive sxs.  She notes fatigue.  She notes hair loss.  She hasn't taken thyroid meds.  She denies any biotin use.       Prior neck u/s revealed multiple thyroid cysts.  Largest in the left lobe was 2.9cm.    Subjective      Review of Systems:   Review of Systems   Constitutional: Positive for fatigue.   Cardiovascular: Negative.    Gastrointestinal: Negative.    Endocrine: Negative.        The following portions of the patient's history were reviewed and updated as appropriate: allergies, current medications, past family history, past medical history, past social history, past surgical history and problem list.    Objective     Visit Vitals  /80   Pulse 71   Ht 162.6 cm (64\")   Wt 119 kg (263 lb)   SpO2 97%   BMI 45.14 kg/m²       Physical Exam:  Physical Exam  Constitutional:       Appearance: Normal appearance.   Neurological:      Mental Status: She is alert.         Labs:    TSH  No results found for: TSHBASE     Free T4  Free T4   Date Value Ref Range Status   2021 0.93 0.93 - 1.70 ng/dL Final     Comment:     Results may be falsely increased if patient taking Biotin.       T3  No results found for: E6VIDHM      TPO  No results found for: THYROIDAB    TG AB  No results found for: THGAB    TG  No results found for: THYROGLB    CBC w/DIFF  Lab Results   Component Value Date    WBC 10.83 (H) 2021    RBC 3.97 2021    HGB 12.1 2021    HCT 39.2 2021    MCV 98.7 (H) 2021    MCH 30.5 2021    MCHC 30.9 (L) 2021    RDW 13.2 2021    RDWSD 46.7 2021    MPV 10.9 2021 "     02/26/2021    NEUTRORELPCT 72.5 02/26/2021    LYMPHORELPCT 16.8 (L) 02/26/2021    MONORELPCT 7.8 02/26/2021    EOSRELPCT 1.9 02/26/2021    BASORELPCT 0.4 02/26/2021    AUTOIGPER 0.6 (H) 02/26/2021    NEUTROABS 7.84 (H) 02/26/2021    LYMPHSABS 1.82 02/26/2021    MONOSABS 0.85 02/26/2021    EOSABS 0.21 02/26/2021    BASOSABS 0.04 02/26/2021    AUTOIGNUM 0.07 (H) 02/26/2021    NRBC 0.0 02/26/2021           Assessment / Plan      Assessment & Plan:  Diagnoses and all orders for this visit:    1. Multiple thyroid nodules (Primary)  Assessment & Plan:  Check TSH today.  Will send note about results.    A neck u/s was performed today.  This revealed larger cyst in the left lobe that appeared relatively stable in size at 3.3cm.  There were tiny cysts noted in the right lobe and rightward isthmus that were stable in size also.  No abnormal lymph nodes were seen.    Plan for another u/s in 2 years.    Orders:  -     TSH; Future  -     US Thyroid    Current Outpatient Medications   Medication Instructions   • cetirizine (ZYRTEC) 10 mg, Oral, Daily      Return in about 1 year (around 2/22/2024) for Recheck with TSH.    Tyron Max MD   02/22/2023

## 2023-02-22 NOTE — ASSESSMENT & PLAN NOTE
Check TSH today.  Will send note about results.    A neck u/s was performed today.  This revealed larger cyst in the left lobe that appeared relatively stable in size at 3.3cm.  There were tiny cysts noted in the right lobe and rightward isthmus that were stable in size also.  No abnormal lymph nodes were seen.    Plan for another u/s in 2 years.

## 2023-02-23 ENCOUNTER — OFFICE VISIT (OUTPATIENT)
Dept: OBSTETRICS AND GYNECOLOGY | Facility: CLINIC | Age: 38
End: 2023-02-23
Payer: COMMERCIAL

## 2023-02-23 VITALS
BODY MASS INDEX: 45 KG/M2 | DIASTOLIC BLOOD PRESSURE: 78 MMHG | HEIGHT: 64 IN | WEIGHT: 263.6 LBS | SYSTOLIC BLOOD PRESSURE: 120 MMHG

## 2023-02-23 DIAGNOSIS — E66.01 MORBID OBESITY WITH BMI OF 40.0-44.9, ADULT: ICD-10-CM

## 2023-02-23 DIAGNOSIS — Z01.419 WOMEN'S ANNUAL ROUTINE GYNECOLOGICAL EXAMINATION: Primary | ICD-10-CM

## 2023-02-23 PROCEDURE — 99395 PREV VISIT EST AGE 18-39: CPT | Performed by: OBSTETRICS & GYNECOLOGY

## 2023-02-23 NOTE — PROGRESS NOTES
Gynecologic Annual Exam Note        Gynecologic Exam        Subjective     HPI  Temitope Moreno is a 37 y.o.  female who presents for annual well woman exam as a established patient. There were no changes to her medical or surgical history since her last visit.. Patient reports problems with: heavy bleeding and longer periods. Pt reports she uses a diva cup and it fills up in 4 hours, which is heavier than in the past. States that 6 months ago she could leave the cup in 8 hours without needing a pad. Patient's last menstrual period was 2023.. Her periods occur every 25-35 days , lasting 6-7 days. The flow is heavier, filling a diva cup in approximately 4 hours. This heavy bleeding lasts for 3 days of her period... She reports dysmenorrhea is severe, occurring throughout menses. Partner Status: Marital Status: .  She is sexually active. She has not had new partners.. STD testing recommendations have been explained to the patient and she does not desire STD testing.    Additional OB/GYN History   Current contraception: contraceptive methods: None  Desires to: do not start contraception- pt is trying to conceive.   Thromboembolic Disease: personal history-father and PGF      History of STD: no    Last Pap : 21. Results: negative. HPV: negative.   Last Completed Pap Smear          PAP SMEAR (Every 3 Years) Next due on 2024  SCANNED - PAP SMEAR    2020  Done - negative                 History of abnormal Pap smear: no  Gardasil status:missed  Family history of uterine, colon, breast, or ovarian cancer: no  Performs monthly Self-Breast Exam: no  Exercises Regularly:yes  Feelings of Anxiety or Depression: no  Tobacco Usage?: No       Current Outpatient Medications:   •  cetirizine (zyrTEC) 10 MG tablet, Take 10 mg by mouth Daily., Disp: , Rfl:      Patient denies the need for medication refills today.    OB History        3    Para   3    Term   3      "  0    AB   0    Living   3       SAB   0    IAB   0    Ectopic   0    Molar   0    Multiple   0    Live Births   3                Health Maintenance   Topic Date Due   • COVID-19 Vaccine (1) Never done   • ANNUAL PHYSICAL  Never done   • INFLUENZA VACCINE  2022   • Annual Gynecologic Pelvic and Breast Exam  2022   • PAP SMEAR  2024   • TDAP/TD VACCINES (2 - Td or Tdap) 2031   • HEPATITIS C SCREENING  Completed   • Pneumococcal Vaccine 0-64  Aged Out       Past Medical History:   Diagnosis Date   • Allergic    • Depression     postpartum, pretreating   • Goiter 2022   • History of ear infections    • History of strep sore throat    • Obesity    • Pregnancy     OTHER THAN FIRST   •  (spontaneous vaginal delivery)    • Thyroid nodule    • Urinary tract infection    • Vitamin D deficiency 2016   • Yeast infection     CHRONIC        Past Surgical History:   Procedure Laterality Date   • WISDOM TOOTH EXTRACTION         The additional following portions of the patient's history were reviewed and updated as appropriate: allergies, current medications, past family history, past medical history, past social history, past surgical history and problem list.    Review of Systems   Constitutional: Negative.    HENT: Negative.    Eyes: Negative.    Respiratory: Negative.    Cardiovascular: Negative.    Gastrointestinal: Negative.    Endocrine: Negative.    Genitourinary: Positive for menstrual problem.   Musculoskeletal: Negative.    Skin: Negative.    Allergic/Immunologic: Negative.    Neurological: Negative.    Hematological: Negative.    Psychiatric/Behavioral: Negative.          I have reviewed and agree with the HPI, ROS, and historical information as entered above. Angy Forde MD        Objective   /78   Ht 162.6 cm (64\")   Wt 120 kg (263 lb 9.6 oz)   LMP 2023   BMI 45.25 kg/m²     Physical Exam  Vitals and nursing note reviewed. Exam conducted with a chaperone " present.   Constitutional:       Appearance: She is well-developed.   HENT:      Head: Normocephalic and atraumatic.   Neck:      Thyroid: Thyromegaly present. No thyroid mass.   Cardiovascular:      Rate and Rhythm: Normal rate and regular rhythm.      Heart sounds: No murmur heard.  Pulmonary:      Effort: Pulmonary effort is normal. No retractions.      Breath sounds: Normal breath sounds. No wheezing, rhonchi or rales.   Chest:      Chest wall: No mass or tenderness.   Breasts:     Right: Normal. No mass, nipple discharge, skin change or tenderness.      Left: Normal. No mass, nipple discharge, skin change or tenderness.   Abdominal:      General: Bowel sounds are normal.      Palpations: Abdomen is soft. Abdomen is not rigid. There is no mass.      Tenderness: There is no abdominal tenderness. There is no guarding.      Hernia: No hernia is present. There is no hernia in the left inguinal area or right inguinal area.   Genitourinary:     General: Normal vulva.      Exam position: Lithotomy position.      Pubic Area: No rash.       Labia:         Right: No rash, tenderness or lesion.         Left: No rash, tenderness or lesion.       Urethra: No urethral pain or urethral swelling.      Vagina: Normal. No vaginal discharge or lesions.      Cervix: No cervical motion tenderness, discharge, lesion or cervical bleeding.      Uterus: Normal. Not enlarged, not fixed and not tender.       Adnexa:         Right: No mass, tenderness or fullness.          Left: No mass, tenderness or fullness.        Rectum: No external hemorrhoid.   Musculoskeletal:      Cervical back: Normal range of motion. No muscular tenderness.   Neurological:      Mental Status: She is alert and oriented to person, place, and time.   Psychiatric:         Behavior: Behavior normal.            Assessment and Plan    Problem List Items Addressed This Visit        Endocrine and Metabolic    Morbid obesity with BMI of 40.0-44.9, adult (HCC)   Other  Visit Diagnoses     Women's annual routine gynecological examination    -  Primary          1. GYN annual well woman exam.   2. Reviewed pap guidelines.   3. Recommended use of Vitamin D replacement and getting adequate calcium in her diet. (1500mg)  4. Reviewed monthly self breast exams.  Instructed to call with lumps, pain, or breast discharge.    5. Reviewed BMI and weight loss as preventative health measures.   6. Reviewed exercise as a preventative health measures.   7. Reccommended Flu Vaccine in Fall of each year.  8. RTC in 1 year or PRN with problems  9. Other: Discussed cycles.  What patient is describing is most likely normal.  We discussed blood work versus ultrasound.  Patient uninterested in work-up at this time.  Return in about 1 year (around 2/23/2024), or if symptoms worsen or fail to improve, for Annual physical.      Angy Forde MD  02/23/2023

## 2024-02-21 ENCOUNTER — OFFICE VISIT (OUTPATIENT)
Dept: ENDOCRINOLOGY | Facility: CLINIC | Age: 39
End: 2024-02-21
Payer: COMMERCIAL

## 2024-02-21 VITALS
DIASTOLIC BLOOD PRESSURE: 82 MMHG | SYSTOLIC BLOOD PRESSURE: 112 MMHG | HEART RATE: 65 BPM | BODY MASS INDEX: 41.52 KG/M2 | OXYGEN SATURATION: 98 % | WEIGHT: 243.2 LBS | HEIGHT: 64 IN

## 2024-02-21 DIAGNOSIS — E04.2 MULTIPLE THYROID NODULES: Primary | ICD-10-CM

## 2024-02-21 LAB — TSH SERPL DL<=0.05 MIU/L-ACNC: 0.46 UIU/ML (ref 0.27–4.2)

## 2024-02-21 PROCEDURE — 99213 OFFICE O/P EST LOW 20 MIN: CPT | Performed by: INTERNAL MEDICINE

## 2024-02-21 PROCEDURE — 84443 ASSAY THYROID STIM HORMONE: CPT | Performed by: INTERNAL MEDICINE

## 2024-02-21 PROCEDURE — 36415 COLL VENOUS BLD VENIPUNCTURE: CPT | Performed by: INTERNAL MEDICINE

## 2024-02-21 RX ORDER — LEVOCETIRIZINE DIHYDROCHLORIDE 5 MG/1
5 TABLET, FILM COATED ORAL EVERY EVENING
COMMUNITY

## 2024-02-21 NOTE — PROGRESS NOTES
"     Office Note      Date: 2024  Patient Name: Temitope Moreno  MRN: [id number]  : 1985    Chief Complaint   Patient presents with    Multiple thyroid nodules       History of Present Illness:   Temitope Moreno is a 38 y.o. female who presents for Multiple thyroid nodules    She has been euthyroid.  She hasn't noted any change in the size of her neck.  She denies any compressive sxs.  She notes fatigue.  She notes hair loss.  She hasn't taken thyroid meds.  She denies any biotin use.       Prior neck u/s revealed multiple thyroid cysts.  Largest in the left lobe was 2.9cm.  It was 3.3cm on u/s from 2023.    Subjective      Review of Systems:   Review of Systems   Constitutional:  Positive for fatigue.   Cardiovascular: Negative.    Gastrointestinal: Negative.    Endocrine: Negative.        The following portions of the patient's history were reviewed and updated as appropriate: allergies, current medications, past family history, past medical history, past social history, past surgical history, and problem list.    Objective     Visit Vitals  /82 (BP Location: Right arm, Patient Position: Sitting, Cuff Size: Large Adult)   Pulse 65   Ht 162.6 cm (64\")   Wt 110 kg (243 lb 3.2 oz)   LMP 2024   SpO2 98%   BMI 41.75 kg/m²       Physical Exam:  Physical Exam  Constitutional:       Appearance: Normal appearance.   Neck:      Thyroid: Thyromegaly present. No thyroid mass or thyroid tenderness.      Comments: Mild enlargement of left thyroid lobe - freely movable  Lymphadenopathy:      Cervical: No cervical adenopathy.   Neurological:      Mental Status: She is alert.         Labs:    TSH  No results found for: \"TSHBASE\"     Free T4  Free T4   Date Value Ref Range Status   2021 0.93 0.93 - 1.70 ng/dL Final     Comment:     Results may be falsely increased if patient taking Biotin.       T3  No results found for: \"M8ZKXAK\"      TPO  No results found for: \"THYROIDAB\"    TG AB  No results " "found for: \"THGAB\"    TG  No results found for: \"THYROGLB\"    CBC w/DIFF  Lab Results   Component Value Date    WBC 10.83 (H) 02/26/2021    RBC 3.97 02/26/2021    HGB 12.1 02/26/2021    HCT 39.2 02/26/2021    MCV 98.7 (H) 02/26/2021    MCH 30.5 02/26/2021    MCHC 30.9 (L) 02/26/2021    RDW 13.2 02/26/2021    RDWSD 46.7 02/26/2021    MPV 10.9 02/26/2021     02/26/2021    NEUTRORELPCT 72.5 02/26/2021    LYMPHORELPCT 16.8 (L) 02/26/2021    MONORELPCT 7.8 02/26/2021    EOSRELPCT 1.9 02/26/2021    BASORELPCT 0.4 02/26/2021    AUTOIGPER 0.6 (H) 02/26/2021    NEUTROABS 7.84 (H) 02/26/2021    LYMPHSABS 1.82 02/26/2021    MONOSABS 0.85 02/26/2021    EOSABS 0.21 02/26/2021    BASOSABS 0.04 02/26/2021    AUTOIGNUM 0.07 (H) 02/26/2021    NRBC 0.0 02/26/2021           Assessment / Plan      Assessment & Plan:  Diagnoses and all orders for this visit:    1. Multiple thyroid nodules (Primary)  Assessment & Plan:  Stable to palpation.  Plan to do neck u/s in a year.  Check TSH today.  Will send note about results.    We discussed using biotin supplement to treat hair loss.    Orders:  -     TSH; Future      Current Outpatient Medications   Medication Instructions    cefdinir (OMNICEF) 300 mg, Oral, 2 Times Daily    levocetirizine (XYZAL) 5 mg, Oral, Every Evening      Return in about 1 year (around 2/21/2025) for Recheck with TSH, neck u/s.    Tyron Max MD   02/21/2024  "

## 2024-02-21 NOTE — ASSESSMENT & PLAN NOTE
Stable to palpation.  Plan to do neck u/s in a year.  Check TSH today.  Will send note about results.    We discussed using biotin supplement to treat hair loss.

## 2024-02-26 ENCOUNTER — OFFICE VISIT (OUTPATIENT)
Dept: OBSTETRICS AND GYNECOLOGY | Facility: CLINIC | Age: 39
End: 2024-02-26
Payer: COMMERCIAL

## 2024-02-26 VITALS
SYSTOLIC BLOOD PRESSURE: 112 MMHG | HEIGHT: 64 IN | DIASTOLIC BLOOD PRESSURE: 64 MMHG | WEIGHT: 240.6 LBS | BODY MASS INDEX: 41.07 KG/M2

## 2024-02-26 DIAGNOSIS — Z01.419 WOMEN'S ANNUAL ROUTINE GYNECOLOGICAL EXAMINATION: Primary | ICD-10-CM

## 2024-02-26 DIAGNOSIS — E66.01 MORBID OBESITY WITH BMI OF 40.0-44.9, ADULT: ICD-10-CM

## 2024-02-26 DIAGNOSIS — N92.6 MENSTRUAL IRREGULARITY: ICD-10-CM

## 2024-02-26 PROCEDURE — 99395 PREV VISIT EST AGE 18-39: CPT | Performed by: OBSTETRICS & GYNECOLOGY

## 2024-02-26 NOTE — PROGRESS NOTES
Gynecologic Annual Exam Note        Gynecologic Exam        Subjective     HPI  Temitope Moreno is a 38 y.o.  female who presents for annual well woman exam as a established patient. There were no changes to her medical or surgical history since her last visit.. Patient's last menstrual period was 2024 (exact date). Her periods occur every 25-35 days, this past year she has had 2 periods in one month 2 weeks apart (2 occurrences one in spring and Dec 2023) , lasting 5-7 days.  The flow is heavy 3rd or 4th day(changing pad/tampon every 3 hours). She reports dysmenorrhea is moderate occurring first 1-2 days of flow. Marital Status: .  She is sexually active. She has not had new partners.. STD testing recommendations have been explained to the patient and she does not desire STD testing.    The patient would like to discuss the following complaints today: none    Additional OB/GYN History   contraceptive methods: Abstinence and Condoms  Desires to: do not start contraception  Thromboembolic Disease:  history-father DVT and PGF-aneurysm   History of migraines: no    History of STD: no    Last Pap : 2021. Results: negative. HPV: negative.   Last Completed Pap Smear            Ordered - PAP SMEAR (Every 3 Years) Ordered on 2021  SCANNED - PAP SMEAR    2020  Done - negative                     History of abnormal Pap smear: no  Gardasil status:unsure if she received the vaccine  Family history of uterine, colon, breast, or ovarian cancer: no  Performs monthly Self-Breast Exam: no  Exercises Regularly:no  Feelings of Anxiety or Depression: yes - both anxiety and depression, self manageable  Tobacco Usage?: No       Current Outpatient Medications:     levocetirizine (XYZAL) 5 MG tablet, Take 1 tablet by mouth Every Evening., Disp: , Rfl:      Patient denies the need for medication refills today.    OB History          3    Para   3    Term   3       0  "   AB   0    Living   3         SAB   0    IAB   0    Ectopic   0    Molar   0    Multiple   0    Live Births   3                Health Maintenance   Topic Date Due    BMI FOLLOWUP  Never done    ANNUAL PHYSICAL  Never done    INFLUENZA VACCINE  2023    COVID-19 Vaccine (3 2023- season) 2023    Annual Gynecologic Pelvic and Breast Exam  2024    PAP SMEAR  2024    TDAP/TD VACCINES (2 - Td or Tdap) 2031    HEPATITIS C SCREENING  Completed    Pneumococcal Vaccine 0-64  Aged Out       Past Medical History:   Diagnosis Date    Allergic     Depression     postpartum, pretreating    Goiter 2022    History of ear infections     History of strep sore throat     Obesity     Pregnancy     OTHER THAN FIRST     (spontaneous vaginal delivery)     Thyroid nodule     Urinary tract infection     Vitamin D deficiency 2016    Yeast infection     CHRONIC        Past Surgical History:   Procedure Laterality Date    WISDOM TOOTH EXTRACTION         The additional following portions of the patient's history were reviewed and updated as appropriate: allergies, current medications, past family history, past medical history, past social history, past surgical history, and problem list.    Review of Systems   Constitutional: Negative.    HENT: Negative.     Eyes: Negative.    Respiratory: Negative.     Cardiovascular: Negative.    Gastrointestinal: Negative.    Endocrine: Negative.    Genitourinary:  Positive for menstrual problem.   Musculoskeletal: Negative.    Skin: Negative.    Allergic/Immunologic: Negative.    Neurological: Negative.    Hematological: Negative.    Psychiatric/Behavioral: Negative.           I have reviewed and agree with the HPI, ROS, and historical information as entered above. Angy Forde MD          Objective   /64   Ht 162.6 cm (64\")   Wt 109 kg (240 lb 9.6 oz)   LMP 2024 (Exact Date)   BMI 41.30 kg/m²     Physical Exam  Vitals and nursing note " reviewed. Exam conducted with a chaperone present.   Constitutional:       Appearance: She is well-developed.   HENT:      Head: Normocephalic and atraumatic.   Neck:      Thyroid: No thyroid mass or thyromegaly.   Cardiovascular:      Rate and Rhythm: Normal rate and regular rhythm.      Heart sounds: No murmur heard.  Pulmonary:      Effort: Pulmonary effort is normal. No retractions.      Breath sounds: Normal breath sounds. No wheezing, rhonchi or rales.   Chest:      Chest wall: No mass or tenderness.   Breasts:     Right: Normal. No mass, nipple discharge, skin change or tenderness.      Left: Normal. No mass, nipple discharge, skin change or tenderness.   Abdominal:      General: Bowel sounds are normal.      Palpations: Abdomen is soft. Abdomen is not rigid. There is no mass.      Tenderness: There is no abdominal tenderness. There is no guarding.      Hernia: No hernia is present. There is no hernia in the left inguinal area or right inguinal area.   Genitourinary:     General: Normal vulva.      Exam position: Lithotomy position.      Pubic Area: No rash.       Labia:         Right: No rash, tenderness or lesion.         Left: No rash, tenderness or lesion.       Urethra: No urethral pain or urethral swelling.      Vagina: Normal. No vaginal discharge or lesions.      Cervix: No cervical motion tenderness, discharge, lesion or cervical bleeding.      Uterus: Normal. Not enlarged, not fixed and not tender.       Adnexa:         Right: No mass, tenderness or fullness.          Left: No mass, tenderness or fullness.        Rectum: No external hemorrhoid.   Musculoskeletal:      Cervical back: Normal range of motion. No muscular tenderness.   Neurological:      Mental Status: She is alert and oriented to person, place, and time.   Psychiatric:         Behavior: Behavior normal.            Assessment and Plan    Problem List Items Addressed This Visit          Endocrine and Metabolic    Morbid obesity with BMI  of 40.0-44.9, adult       Genitourinary and Reproductive     Menstrual irregularity    Overview     2/26/2024-patient reports in the past year she has had 2 months where she has had 2 periods during that month.  Will get an ultrasound to assess.  Consider endometrial biopsy as well.  Patient reports thyroid completed this week was within normal limits.         Relevant Orders    LIQUID-BASED PAP SMEAR WITH HPV GENOTYPING REGARDLESS OF INTERPRETATION ([facility])    US Non-ob Transvaginal     Other Visit Diagnoses       Women's annual routine gynecological examination    -  Primary    Relevant Orders    LIQUID-BASED PAP SMEAR WITH HPV GENOTYPING REGARDLESS OF INTERPRETATION ([facility])            GYN annual well woman exam.   Reviewed pap guidelines.   Recommended use of Vitamin D replacement and getting adequate calcium in her diet. (1500mg)  Reviewed monthly self breast exams.  Instructed to call with lumps, pain, or breast discharge.    Reviewed BMI and weight loss as preventative health measures.   Reviewed exercise as a preventative health measures.   Reccommended Flu Vaccine in Fall of each year.  Return in about 2 weeks (around 3/11/2024) for ultrasound with possible endometrial biopsy.    Angy Forde MD  02/26/2024

## 2024-02-29 LAB — REF LAB TEST METHOD: NORMAL

## 2024-03-14 ENCOUNTER — OFFICE VISIT (OUTPATIENT)
Dept: OBSTETRICS AND GYNECOLOGY | Facility: CLINIC | Age: 39
End: 2024-03-14
Payer: COMMERCIAL

## 2024-03-14 VITALS
WEIGHT: 240.6 LBS | HEIGHT: 64 IN | BODY MASS INDEX: 41.07 KG/M2 | DIASTOLIC BLOOD PRESSURE: 62 MMHG | SYSTOLIC BLOOD PRESSURE: 112 MMHG

## 2024-03-14 DIAGNOSIS — N92.6 MENSTRUAL IRREGULARITY: Primary | ICD-10-CM

## 2024-03-14 DIAGNOSIS — J01.10 ACUTE NON-RECURRENT FRONTAL SINUSITIS: ICD-10-CM

## 2024-03-14 LAB
B-HCG UR QL: NEGATIVE
EXPIRATION DATE: NORMAL
INTERNAL NEGATIVE CONTROL: NORMAL
INTERNAL POSITIVE CONTROL: NORMAL
Lab: NORMAL

## 2024-03-14 RX ORDER — AZITHROMYCIN 1 G/1
1 POWDER, FOR SUSPENSION ORAL ONCE
Qty: 1 PACKET | Refills: 0 | Status: SHIPPED | OUTPATIENT
Start: 2024-03-14 | End: 2024-03-14

## 2024-03-14 NOTE — PROGRESS NOTES
"          Chief Complaint   Patient presents with    Follow-up         Subjective   HPI  Temitope Moreno is a 38 y.o. female, , her last LMP was Patient's last menstrual period was 2024 (exact date)..  She presents for a follow up evaluation of  irregular menses . The patient was last seen 24 for her annual by Angy Forde MD. At that time she reported \"in the past year she has had 2 months where she has had 2 periods during that month (one occurrence in spring and one in Dec 2023). Patient reports thyroid completed this week was within normal limits.\"      She had US performed. The plan was expectant management. Since the last visit the patient reports the plan has remained unchanged. Pt states her LMP lasted 5 days, flow was heavy day 2-4 (changing pad/tampon every 3 hours). The patient reports additional symptoms as none.      US done today: Yes.  Findings showed Uterus anteverted with an endometrial thickness of 8.5 mm.  Adenomyosis appearance in the myometrium.  Ovaries appear within normal limits..  I have personally evaluated the U/S and agree with the findings. Angy Forde MD        Additional OB/GYN History   Last Pap : 24 neg, HPV neg  Last Completed Pap Smear            PAP SMEAR (Every 3 Years) Next due on 2024  LIQUID-BASED PAP SMEAR WITH HPV GENOTYPING REGARDLESS OF INTERPRETATION ([facility])    2021  SCANNED - PAP SMEAR    2020  Done - negative                  History of abnormal Pap smear: no  Tobacco Usage?: No       Current Outpatient Medications:     levocetirizine (XYZAL) 5 MG tablet, Take 1 tablet by mouth Every Evening., Disp: , Rfl:     azithromycin (Zithromax) 1 g powder, Take 1 packet by mouth 1 (One) Time for 1 dose., Disp: 1 packet, Rfl: 0     Past Medical History:   Diagnosis Date    Allergic     Depression     postpartum, pretreating    Goiter 2022    History of ear infections     History of strep sore throat     " Obesity     Pregnancy     OTHER THAN FIRST     (spontaneous vaginal delivery)     Thyroid nodule     Urinary tract infection     Vitamin D deficiency 2016    Yeast infection     CHRONIC        Past Surgical History:   Procedure Laterality Date    WISDOM TOOTH EXTRACTION         The additional following portions of the patient's history were reviewed and updated as appropriate: allergies, current medications, past family history, past medical history, past social history, past surgical history, and problem list.    Review of Systems   Constitutional: Negative.    HENT:  Positive for congestion, facial swelling and sinus pressure.    Eyes: Negative.    Respiratory: Negative.     Cardiovascular: Negative.    Gastrointestinal: Negative.    Endocrine: Negative.    Genitourinary:  Positive for menstrual problem.   Musculoskeletal: Negative.    Skin: Negative.    Allergic/Immunologic: Negative.    Neurological: Negative.    Hematological: Negative.    Psychiatric/Behavioral: Negative.         I have reviewed and agree with the HPI, ROS, and historical information as entered above. Angy Forde MD             Gynecologic Procedure Note        Endometrial Biopsy      Indications: Temitope Moreno is a 38 y.o. , who presents today for endometrial biopsy.  The patient was noted to have Abnormal Uterine Bleeding.  Her LMP is Patient's last menstrual period was 2024 (exact date). . After being presented with the risk, benefits, and specific detail of the procedure, the patient wished to proceed.  Written consent was obtained from patient.   Urine pregnancy test was Negative. Patient does not have an allergy to betadine or shellfish.     Procedure Details     Time out: immediate members of the procedure team and patient agree to the following: correct patient, correct site, correct procedure to be performed. Angy Forde MD      The patient was placed on the table in the dorsal lithotomy  "position.  She was draped in the appropriate manner.  A speculum was placed in the vagina.  The cervix was visualized and prepped with Betadine.  A tenaculum was placed on the anterior lip of the cervix for traction.  A small plastic 5 mm Pipelle syringe curette was inserted into the cervical canal.  The uterus was sounded to 7 cm's.  A vigorous four quadrant biopsy was performed, removing a medium amount of tissue.  The tissue was placed in Formalin and sent to Pathology.  The patient tolerated the procedure well and she reported mild cramping.  The tenaculum was removed from the cervix and the speculum was removed.  The patient was observed for 5 minutes.           Complications: none.     Endometrial Biopsy    Date/Time: 3/14/2024 11:50 AM    Performed by: Angy Forde MD  Authorized by: Angy Forde MD    Consent:     Consent obtained: written    Consent given by: patient    Risks discussed: bleeding and infection    Alternatives discussed: observation    Patient agrees, verbalizes understanding, and wants to proceed: yes    Pre-procedure:     Urine pregnancy test: negative    Procedure:     Prepped with: Betadine    Tenaculum used: yes      A local block was performed: no      Cervix dilated: no      Number of passes: 1  Findings:     Cervix: normal      Specimen collected: specimen collected and sent to pathology      Patient tolerance: tolerated well, no immediate complications      Review of Systems   Constitutional: Negative.    HENT:  Positive for congestion, facial swelling and sinus pressure.    Eyes: Negative.    Respiratory: Negative.     Cardiovascular: Negative.    Gastrointestinal: Negative.    Endocrine: Negative.    Genitourinary:  Positive for menstrual problem.   Musculoskeletal: Negative.    Skin: Negative.    Allergic/Immunologic: Negative.    Neurological: Negative.    Hematological: Negative.    Psychiatric/Behavioral: Negative.       /62   Ht 162.6 cm (64\")   Wt " "109 kg (240 lb 9.6 oz)   LMP 03/03/2024 (Exact Date)   BMI 41.30 kg/m²       Plan:  Orders Placed This Encounter   Procedures    Endometrial Biopsy     This order was created via procedure documentation     Order Specific Question:   Release to patient     Answer:   Routine Release [[id number]]    POC Pregnancy, Urine     Order Specific Question:   Release to patient     Answer:   Routine Release [[id number]]       Problem List Items Addressed This Visit          Genitourinary and Reproductive     Menstrual irregularity - Primary    Overview     2/26/2024-patient reports in the past year she has had 2 months where she has had 2 periods during that month.  Will get an ultrasound to assess.  Consider endometrial biopsy as well.  Patient reports thyroid completed this week was within normal limits.  3/14/2024-ultrasound showsUterus anteverted with an endometrial thickness of 8.5 mm.  Adenomyosis appearance in the myometrium.  Ovaries appear within normal limits.  Endometrial biopsy performed.  Discussed treatment options.  Patient not interested in any treatment at this time.  She would like to avoid hormones.  We discussed progestin only pills to avoid any form of clot risk.  Patient would like to consider and watch for now.  At         Relevant Orders    POC Pregnancy, Urine (Completed)    TISSUE EXAM, [facility] ([facility])     Other Visit Diagnoses       Acute non-recurrent frontal sinusitis        Relevant Medications    azithromycin (Zithromax) 1 g powder                Instructions  Call the office in 5 business days for biopsy results.  Patient instructed to call the office if develops a fever of 100.4 or greater, vaginal bleeding heavier than a period, foul vaginal discharge or pain.     Angy Forde MD  03/14/2024     Objective   /62   Ht 162.6 cm (64\")   Wt 109 kg (240 lb 9.6 oz)   LMP 03/03/2024 (Exact Date)   BMI 41.30 kg/m²     Physical Exam  Vitals and nursing note reviewed. Exam " conducted with a chaperone present.   HENT:      Head: Normocephalic.      Comments: Pain and tenderness over frontal bones and maxillary sinuses.  Genitourinary:     General: Normal vulva.      Exam position: Lithotomy position.      Labia:         Right: No rash, tenderness or lesion.         Left: No rash, tenderness or lesion.       Urethra: No urethral pain, urethral swelling or urethral lesion.      Vagina: Normal. No tenderness or lesions.      Cervix: No cervical motion tenderness, discharge, lesion or cervical bleeding.      Uterus: Normal. Not enlarged, not fixed and not tender.       Adnexa:         Right: No mass, tenderness or fullness.          Left: No mass, tenderness or fullness.        Rectum: No external hemorrhoid.      Comments: Chaperone Present        Assessment & Plan     Assessment     Problem List Items Addressed This Visit          Genitourinary and Reproductive     Menstrual irregularity - Primary    Overview     2/26/2024-patient reports in the past year she has had 2 months where she has had 2 periods during that month.  Will get an ultrasound to assess.  Consider endometrial biopsy as well.  Patient reports thyroid completed this week was within normal limits.  3/14/2024-ultrasound showsUterus anteverted with an endometrial thickness of 8.5 mm.  Adenomyosis appearance in the myometrium.  Ovaries appear within normal limits.  Endometrial biopsy performed.  Discussed treatment options.  Patient not interested in any treatment at this time.  She would like to avoid hormones.  We discussed progestin only pills to avoid any form of clot risk.  Patient would like to consider and watch for now.  At         Relevant Orders    POC Pregnancy, Urine (Completed)    TISSUE EXAM, [facility] ([facility])     Other Visit Diagnoses       Acute non-recurrent frontal sinusitis        Relevant Medications    azithromycin (Zithromax) 1 g powder              Plan     Call for heavy bleeding  Medication(s)  Ordered  Discussed potential etiologies and treatment options.   Follow Up: Return if symptoms worsen or fail to improve, for Annual physical.        Angy Forde MD  03/14/2024

## 2024-03-15 LAB — REF LAB TEST METHOD: NORMAL

## 2025-02-07 NOTE — ADDENDUM NOTE
Addended by: NARESH VAUGHN on: 2/2/2021 07:28 PM     Modules accepted: Orders     Admit: 2025    Name:  Sandor Early  Room:  [facility]0220-  MRN:    [id number]     Daily Progress Note for 2025     Admitted with influenza A,acute copd exacerbation  Placed on BiPAP    Interval History:     Breathing stable on home o2 of  4L.  Did not use BiPAP overnight.    Passing some gas   No BM    Had a focal seizure involving LUE this am.  Lasted for a couple of minutes   I witnessed it.  No LOC  He says that the used to take seizure meds in the past     Scheduled Meds:   albuterol  2.5 mg Nebulization 4x Daily RT    metoprolol succinate  50 mg Oral BID    LORazepam  2 mg Oral Once    sodium chloride flush  5-40 mL IntraVENous 2 times per day    enoxaparin  40 mg SubCUTAneous Daily    guaiFENesin  600 mg Oral BID    insulin lispro  0-4 Units SubCUTAneous 4x Daily AC & HS    gabapentin  800 mg Oral 4x Daily    dilTIAZem  180 mg Oral Daily    allopurinol  300 mg Oral Daily    vitamin D3  5,000 Units Oral Daily    [Held by provider] furosemide  20 mg Oral Daily    folic acid  1,000 mcg Oral Daily    ferrous sulfate  325 mg Oral BID WC    insulin glargine  15 Units SubCUTAneous Nightly    DULoxetine  60 mg Oral Daily    atorvastatin  40 mg Oral Daily    budesonide-formoterol  2 puff Inhalation BID RT    And    tiotropium  2 puff Inhalation Daily RT       Continuous Infusions:   sodium chloride 75 mL/hr at 25 0235    sodium chloride      dextrose         PRN Meds:  benzocaine, morphine, phenol, albuterol sulfate HFA, sodium chloride flush, sodium chloride, ondansetron **OR** ondansetron, acetaminophen **OR** acetaminophen, benzonatate, albuterol, dextrose bolus **OR** dextrose bolus, glucagon (rDNA), dextrose, potassium chloride **OR** potassium chloride, magnesium sulfate, calcium carbonate, oxyCODONE HCl, glucose           Objective:     Temp  Av °F (36.7 °C)  Min: 97.6 °F (36.4 °C)  Max: 98.7 °F (37.1 °C)  Pulse  Av.1  Min: 96  Max: 122  BP  Min: 166/75  Max: 183/78  SpO2  Av.6 %

## 2025-02-24 ENCOUNTER — OFFICE VISIT (OUTPATIENT)
Dept: ENDOCRINOLOGY | Facility: CLINIC | Age: 40
End: 2025-02-24
Payer: COMMERCIAL

## 2025-02-24 VITALS
HEART RATE: 60 BPM | OXYGEN SATURATION: 99 % | WEIGHT: 252 LBS | DIASTOLIC BLOOD PRESSURE: 70 MMHG | HEIGHT: 64 IN | BODY MASS INDEX: 43.02 KG/M2 | SYSTOLIC BLOOD PRESSURE: 120 MMHG

## 2025-02-24 DIAGNOSIS — E04.2 MULTIPLE THYROID NODULES: Primary | ICD-10-CM

## 2025-02-24 LAB — TSH SERPL DL<=0.05 MIU/L-ACNC: 0.94 UIU/ML (ref 0.27–4.2)

## 2025-02-24 PROCEDURE — 99213 OFFICE O/P EST LOW 20 MIN: CPT | Performed by: INTERNAL MEDICINE

## 2025-02-24 PROCEDURE — 76536 US EXAM OF HEAD AND NECK: CPT | Performed by: INTERNAL MEDICINE

## 2025-02-24 PROCEDURE — 84443 ASSAY THYROID STIM HORMONE: CPT | Performed by: INTERNAL MEDICINE

## 2025-02-24 PROCEDURE — 36415 COLL VENOUS BLD VENIPUNCTURE: CPT | Performed by: INTERNAL MEDICINE

## 2025-02-24 NOTE — PROGRESS NOTES
"     Office Note      Date: 2025  Patient Name: Temitope Moreno  MRN: [id number]  : 1985    Chief Complaint   Patient presents with    Thyroid Problem     Multiple thyroid nodules       History of Present Illness:   Temitope Moreno is a 39 y.o. female who presents for Thyroid Problem (Multiple thyroid nodules)    She has been euthyroid.  She hasn't noted any change in the size of her neck.  She denies any compressive sxs.  She notes fatigue.  She notes hair loss.  She hasn't taken thyroid meds.  She denies any biotin use.       Prior neck u/s revealed multiple thyroid cysts.  Largest in the left lobe was 2.9cm.  It was 3.3cm on u/s from 2023.    Subjective      Review of Systems:   Review of Systems   Constitutional:  Positive for fatigue.   Cardiovascular: Negative.    Gastrointestinal: Negative.    Endocrine: Negative.        The following portions of the patient's history were reviewed and updated as appropriate: allergies, current medications, past family history, past medical history, past social history, past surgical history, and problem list.    Objective     Visit Vitals  /70 (BP Location: Right arm, Patient Position: Sitting, Cuff Size: Adult)   Pulse 60   Ht 162.6 cm (64\")   Wt 114 kg (252 lb)   SpO2 99%   BMI 43.26 kg/m²       Physical Exam:  Physical Exam  Constitutional:       Appearance: Normal appearance.   Neurological:      Mental Status: She is alert.       Labs:    TSH  No results found for: \"TSHBASE\"     Free T4  Free T4   Date Value Ref Range Status   2021 0.93 0.93 - 1.70 ng/dL Final     Comment:     Results may be falsely increased if patient taking Biotin.       T3  No results found for: \"L8YFADG\"      TPO  No results found for: \"THYROIDAB\"    TG AB  No results found for: \"THGAB\"    TG  No results found for: \"THYROGLB\"    CBC w/DIFF  Lab Results   Component Value Date    WBC 10.83 (H) 2021    RBC 3.97 2021    HGB 12.1 2021    HCT 39.2 " 02/26/2021    MCV 98.7 (H) 02/26/2021    MCH 30.5 02/26/2021    MCHC 30.9 (L) 02/26/2021    RDW 13.2 02/26/2021    RDWSD 46.7 02/26/2021    MPV 10.9 02/26/2021     02/26/2021    NEUTRORELPCT 72.5 02/26/2021    LYMPHORELPCT 16.8 (L) 02/26/2021    MONORELPCT 7.8 02/26/2021    EOSRELPCT 1.9 02/26/2021    BASORELPCT 0.4 02/26/2021    AUTOIGPER 0.6 (H) 02/26/2021    NEUTROABS 7.84 (H) 02/26/2021    LYMPHSABS 1.82 02/26/2021    MONOSABS 0.85 02/26/2021    EOSABS 0.21 02/26/2021    BASOSABS 0.04 02/26/2021    AUTOIGNUM 0.07 (H) 02/26/2021    NRBC 0.0 02/26/2021           Assessment / Plan      Assessment & Plan:  Diagnoses and all orders for this visit:    1. Multiple thyroid nodules (Primary)  Assessment & Plan:  Check  TSH today.    A neck u/s was performed today.  This revealed multiple nodules in both thyroid lobes and isthmus.  The largest was a cyst in the left lobe that measured 3.37cm.  The nodule in the right lobe was <5mm.  These appear stable compared to u/s from 2/2023.  The prior cyst in the isthmus was not seen today.  No abnormal lymph nodes were seen.    Plan for another u/s in 3 years.    Orders:  -     TSH; Future  -     US Thyroid      Current Outpatient Medications   Medication Instructions    Sodium Fluoride 5000 Sensitive 1.1-5 % gel BRUSH WITH TOOTHPASTE 1 TIME PER DAY      Return in about 1 year (around 2/24/2026) for Recheck with TSH.    Electronically signed by: Tyron Max MD  02/24/2025

## 2025-02-24 NOTE — ASSESSMENT & PLAN NOTE
Check  TSH today.    A neck u/s was performed today.  This revealed multiple nodules in both thyroid lobes and isthmus.  The largest was a cyst in the left lobe that measured 3.37cm.  The nodule in the right lobe was <5mm.  These appear stable compared to u/s from 2/2023.  The prior cyst in the isthmus was not seen today.  No abnormal lymph nodes were seen.    Plan for another u/s in 3 years.

## 2025-03-20 ENCOUNTER — OFFICE VISIT (OUTPATIENT)
Dept: OBSTETRICS AND GYNECOLOGY | Facility: CLINIC | Age: 40
End: 2025-03-20
Payer: COMMERCIAL

## 2025-03-20 VITALS
WEIGHT: 252 LBS | DIASTOLIC BLOOD PRESSURE: 70 MMHG | HEIGHT: 64 IN | SYSTOLIC BLOOD PRESSURE: 118 MMHG | BODY MASS INDEX: 43.02 KG/M2

## 2025-03-20 DIAGNOSIS — Z01.419 WOMEN'S ANNUAL ROUTINE GYNECOLOGICAL EXAMINATION: Primary | ICD-10-CM

## 2025-03-20 DIAGNOSIS — Z80.0 FAMILY HISTORY OF PANCREATIC CANCER: ICD-10-CM

## 2025-03-20 DIAGNOSIS — E66.01 MORBID OBESITY WITH BMI OF 40.0-44.9, ADULT: ICD-10-CM

## 2025-03-20 DIAGNOSIS — F41.9 ANXIETY: ICD-10-CM

## 2025-03-20 DIAGNOSIS — Z12.31 BREAST CANCER SCREENING BY MAMMOGRAM: ICD-10-CM

## 2025-03-20 NOTE — PROGRESS NOTES
Gynecologic Annual Exam Note        Gynecologic Exam        Subjective     HPI  Temitope Moreno is a 39 y.o.  female who presents for annual well woman exam as a established patient. There were no changes to her medical or surgical history since her last visit.. Patient's last menstrual period was 2025 (exact date). Her periods occur every 25-35 days, lasting 5-6 days.  The flow is moderate. She reports dysmenorrhea is moderate occurring throughout menses. Marital Status: .  She is sexually active. She has not had new partners.. STD testing recommendations have been explained to the patient and she does not desire STD testing.    The patient would like to discuss the following complaints today: Pt states past 1 year periods have been normal. EMB was done for AUB on 3/14/2024    Additional OB/GYN History   contraceptive methods: Condoms  Desires to: do not start contraception  Thromboembolic Disease: family history  History of migraines: no    History of STD: no    Last Pap : 2024. Results: negative. HPV: negative.   Last Completed Pap Smear            Upcoming       PAP SMEAR (Every 3 Years) Next due on 2024  LIQUID-BASED PAP SMEAR WITH HPV GENOTYPING REGARDLESS OF INTERPRETATION ([facility])    2021  SCANNED - PAP SMEAR    2020  Done - negative                             History of abnormal Pap smear: no  Gardasil status: has not received   Family history of uterine, colon, breast, or ovarian cancer: no  Performs monthly Self-Breast Exam: no  Exercises Regularly:yes  Feelings of Anxiety or Depression: no  Tobacco Usage?: No     No current outpatient medications on file.     Patient denies the need for medication refills today.    OB History          3    Para   3    Term   3       0    AB   0    Living   3         SAB   0    IAB   0    Ectopic   0    Molar   0    Multiple   0    Live Births   3                Health Maintenance   Topic  "Date Due    BMI FOLLOWUP  Never done    ANNUAL PHYSICAL  Never done    INFLUENZA VACCINE  2024    COVID-19 Vaccine (3 - 2024- season) 2024    Annual Gynecologic Pelvic and Breast Exam  2025    PAP SMEAR  2027    TDAP/TD VACCINES (2 - Td or Tdap) 2031    HEPATITIS C SCREENING  Completed    Pneumococcal Vaccine 0-49  Aged Out       Past Medical History:   Diagnosis Date    Allergic     Depression     postpartum, pretreating    Goiter 2022    History of ear infections     History of strep sore throat     Obesity     Pregnancy     OTHER THAN FIRST     (spontaneous vaginal delivery)     Thyroid nodule     Urinary tract infection     Vitamin D deficiency 2016    Yeast infection     CHRONIC        Past Surgical History:   Procedure Laterality Date    WISDOM TOOTH EXTRACTION         The additional following portions of the patient's history were reviewed and updated as appropriate: allergies, current medications, past family history, past medical history, past social history, past surgical history, and problem list.    Review of Systems   Constitutional: Negative.    HENT: Negative.     Eyes: Negative.    Respiratory: Negative.     Cardiovascular: Negative.    Gastrointestinal: Negative.    Endocrine: Negative.    Genitourinary: Negative.    Musculoskeletal: Negative.    Skin: Negative.    Allergic/Immunologic: Negative.    Neurological: Negative.    Hematological: Negative.    Psychiatric/Behavioral: Negative.           I have reviewed and agree with the HPI, ROS, and historical information as entered above. Angy Forde MD          Objective   /70   Ht 162.6 cm (64\")   Wt 114 kg (252 lb)   LMP 2025 (Exact Date)   BMI 43.26 kg/m²     Physical Exam  Vitals and nursing note reviewed. Exam conducted with a chaperone present.   Constitutional:       Appearance: She is well-developed.   HENT:      Head: Normocephalic and atraumatic.   Neck:      Thyroid: No " thyroid mass or thyromegaly.   Cardiovascular:      Rate and Rhythm: Normal rate and regular rhythm.      Heart sounds: No murmur heard.  Pulmonary:      Effort: Pulmonary effort is normal. No retractions.      Breath sounds: Normal breath sounds. No wheezing, rhonchi or rales.   Chest:      Chest wall: No mass or tenderness.   Breasts:     Right: Normal. No mass, nipple discharge, skin change or tenderness.      Left: Normal. No mass, nipple discharge, skin change or tenderness.   Abdominal:      General: Bowel sounds are normal.      Palpations: Abdomen is soft. Abdomen is not rigid. There is no mass.      Tenderness: There is no abdominal tenderness. There is no guarding.      Hernia: No hernia is present. There is no hernia in the left inguinal area or right inguinal area.   Genitourinary:     General: Normal vulva.      Exam position: Lithotomy position.      Pubic Area: No rash.       Labia:         Right: No rash, tenderness or lesion.         Left: No rash, tenderness or lesion.       Urethra: No urethral pain or urethral swelling.      Vagina: Normal. No vaginal discharge or lesions.      Cervix: No cervical motion tenderness, discharge, lesion or cervical bleeding.      Uterus: Normal. Not enlarged, not fixed and not tender.       Adnexa:         Right: No mass, tenderness or fullness.          Left: No mass, tenderness or fullness.        Rectum: No external hemorrhoid.   Musculoskeletal:      Cervical back: Normal range of motion. No muscular tenderness.   Neurological:      Mental Status: She is alert and oriented to person, place, and time.   Psychiatric:         Behavior: Behavior normal.            Assessment and Plan    Problem List Items Addressed This Visit          Endocrine and Metabolic    Morbid obesity with BMI of 40.0-44.9, adult       Family History    Family history of pancreatic cancer    Overview   Mom age52         Relevant Orders    Ambulatory Referral to Genetic Counseling/Testing        Mental Health    Anxiety    Overview   On Zoloft, feels like it needs to be increased.  Will increase to 100 mg daily          Other Visit Diagnoses         Women's annual routine gynecological examination    -  Primary      Breast cancer screening by mammogram        Relevant Orders    Mammo Screening Digital Tomosynthesis Bilateral With CAD            GYN annual well woman exam.   Reviewed pap guidelines.   Recommended use of Vitamin D replacement and getting adequate calcium in her diet. (1500mg)  Reviewed monthly self breast exams.  Instructed to call with lumps, pain, or breast discharge.    Reviewed BMI and weight loss as preventative health measures.   Reviewed exercise as a preventative health measures.   Reccommended Flu Vaccine in Fall of each year.  RTC in 1 year or PRN with problems  Return in about 1 year (around 3/20/2026) for Annual physical.    Angy Forde MD  03/20/2025

## 2025-04-17 ENCOUNTER — LAB (OUTPATIENT)
Dept: LAB | Facility: HOSPITAL | Age: 40
End: 2025-04-17
Payer: COMMERCIAL

## 2025-04-17 ENCOUNTER — CLINICAL SUPPORT (OUTPATIENT)
Dept: GENETICS | Facility: HOSPITAL | Age: 40
End: 2025-04-17
Payer: COMMERCIAL

## 2025-04-17 DIAGNOSIS — Z80.8 FAMILY HISTORY OF MELANOMA: ICD-10-CM

## 2025-04-17 DIAGNOSIS — Z13.79 GENETIC TESTING: Primary | ICD-10-CM

## 2025-04-17 DIAGNOSIS — Z80.0 FAMILY HISTORY OF PANCREATIC CANCER: ICD-10-CM

## 2025-04-17 DIAGNOSIS — Z83.79 FAMILY HISTORY OF PANCREATITIS: ICD-10-CM

## 2025-04-17 PROCEDURE — 36415 COLL VENOUS BLD VENIPUNCTURE: CPT

## 2025-04-17 PROCEDURE — 96041 GENETIC COUNSELING SVC EA 30: CPT

## 2025-04-17 NOTE — PROGRESS NOTES
Encounter Date: 2025   Patient Name: Temitope Moreno  : 1985   MRN: [id number]     Referring Provider: Angy Forde MD     REASON FOR VISIT  Temitope Moreno is a 39 y.o. female referred for genetic counseling due to a family history of pancreatic cancer.  Ms. Moreno does not have a personal history of cancer.  She reached menarche around age 12 and had her first child at age 29.  She is premenopausal and has never used HRT.  She has never undergone mammogram or colonoscopy screenings.  She retains her ovaries and uterus.  Ms. Moreno elected to pursue genetic testing via [facility]t-Cancer panel with [facility] analyzing 81-cancer and pancreatitis risk genes.    PERTINENT FAMILY HISTORY:  A cancer-focused four-generation pedigree was obtained via patient reporting    Mother - pancreatic cancer, 53  Maternal Grandfather - multiple melanomas x 7 or 8  Maternal 1st Cousin Once-Removed (cousin of MGF) - chronic pancreatitis  Paternal Great Grandmother - bilateral breast cancer, 75    RISK ASSESSMENT  Ms. Moreno's reported family history is suggestive of  hereditary cancer risk.  Ms. Moreno meets NCCN guidelines criteria for genetic testing based on her mother's pancreatic cancer diagnosis and her maternal grandfather having more than 3 melanomas removed over his lifetime.  It should be noted as well that her paternal great grandmother was reportedly diagnosed with bilateral breast cancer.  The general population lifetime risk for pancreatic cancer is 1% - 2%, and pancreatic cancer associated with inherited mutations accounts for approximately 10% of all pancreatic cancer cases.  Most of these hereditary pancreatic cancer cases are associated with mutations in genes associated with known cancer syndromes such as, but not limited to, Hereditary Breast and Ovarian Cancer syndrome, Arenas syndrome, Li Fraumeni syndrome, and Peutz-Jeghers syndrome.  Other high risk pancreatic cancer  "risk genes identified by the National Comprehensive Cancer Network (NCCN) are PALB2, DUNIA, and CDKN2A.  Mutations in these genes can cause an increase in lifetime pancreatic cancer risk potentially over 15% as with CDKN2A and STK11 (Puetz-Jeghers syndrome).  The degree of risk and screening recommendations vary by gene, the individual's age, and related family history.    GENETIC COUNSELING  We reviewed the family history information in detail. Cases of cancer follow three general patterns: sporadic, familial, and hereditary.  While most cancer cases are sporadic (~70% of cancer cases), some cases appear to occur in family clusters.  These cases are said to be familial and account for around 20% of cancer cases.  Familial cases may be due to a combination of shared genes and environmental factors among family members that we cannot evaluate via genetic testing at this time.  In 5% - 10% of cancer cases, the risk for cancer is inherited, and the genes responsible for the increased cancer risk are known.      Family histories typical of hereditary cancer syndromes usually include multiple first- and second-degree relatives diagnosed with cancer types that define a syndrome.  These cases tend to be diagnosed at younger-than-expected ages and can be bilateral or multifocal.  The cancer in these families follows an autosomal dominant inheritance pattern, which indicates the likely presence of a mutation in a cancer risk gene.  Children and siblings of an individual carrying a mutation have a 50% chance of inheriting that mutation, thereby inheriting the increased risk to develop cancer.  However, it is not recommended to pursue genetic testing for adult-onset cancers in children as the results would not have clinical utility until some time in adulthood among other ethical reasons.  These mutations can be passed down from the maternal or the paternal lineage.    We discussed that risk factors or \"red flags\" for hereditary " risk include cancers diagnosed at earlier-than-average ages, multiple family members diagnosed with cancers associated with mutations in the same gene, or multiple generations of associated cancers. Dependent on the specific cancer type or syndrome, there exists the potential for several clinically significant genes related to the cancer's onset or increased risk for development.  Therefore, the standard approach to hereditary cancer genetic testing at this time is via multi-gene panel analyzing several genes associated with a hereditary risk for cancer.  Once results are completed, we will review them in the context of the patient's personal and family history to determine what, if any, post-test cancer risk management changes are recommended.  When affected relatives are unavailable or unwilling to pursue genetic testing, it is reasonable to offer genetic testing to an unaffected individual.      GENETIC TESTING  The risks, benefits, and limitations of genetic testing and implications for clinical management following testing were reviewed.  Genetic test results can influence decisions regarding screening and prevention.  Genetic testing can have significant psychological implications for both individuals and families. Also discussed was the possibility of insurance discrimination based on genetic test results and the laws in place to prevent this, as well as the limitations of these laws.      The Genetic Information Nondiscrimination Act (NORY) is a federal law enacted in May 2008.  NORY prohibits discrimination on the basis of genetic information such as genetic test results with respect to health insurance and employment status.  Under Title 1 of NORY, health insurance companies are prohibited from using an individual's genetic information to change premiums, drop or change an individual's coverage, or prevent coverage from being acquired.  Title 2 of NORY prohibits employers from using genetic information in  "employment decisions such as, but not limited to, hiring, firing, promotions, pay, and job assignments.  NORY protections do not protect against genetic discrimination by life insurance, long-term care or disability insurance,  service members, federal employees, those using the  Health Service, or those employed by a small business with fewer than 15 employees.    We discussed panel testing, which could involve testing numerous genes associated with increased cancer risk. The implications of a positive,negative, or VUS test result were discussed.  We also discussed the importance of testing an affected relative. In general, a negative genetic test result is most informative if a mutation has first been established in an affected member of the family.  In cases where an affected individual is not available or interested in testing, it is appropriate to offer testing to an unaffected individual.     With multigene panel testing, it is not uncommon for a variant of uncertain significance (VUS) to be identified.  Variants are termed \"VUS\" when there is not sufficient evidence to classify the variant as a negative (not harmful) variant or a positive (harmful) mutation. Genetic testing labs work to reclassify all VUSs overtime and will issue an updated report when a reclassification occurs.  The majority (over 90%) of VUSs that are reclassified are found to be negative genetic variants, however a small percentage will be upgraded to a harmful mutation. Clinically, a VUS is treated as a negative result.  If genetic testing is negative or a variant of uncertain significance (VUS) is found, management should be guided by a family history-based risk assessment.  Medical care should not be altered based on a VUS result.  Genetic testing for other unaffected (never had cancer) relatives is not recommended for a VUS.    We discussed that there are limitations to testing an individual who has not had cancer.  A " negative test result does not mean Ms. Moreno's risk for cancer is low or even normal, although the risk would not be as high as it would with positive (harmful mutation) result.  It could still be increased over the general population based on family history alone.  If Ms. Moreno tests negative it could be for several different reasons such as:    The possibility that there is a pathogenic variant causing cancer in the family, and that gene was on the patient's test, but Ms. Moreno did not inherit it. We cannot know if this is the case by only testing the patient.  A familial mutation could therefore still be present in the family and other close family members are still at risk.  Ms. Moreno could have a pathogenic variant in one of the genes tested for that was not detected. Current testing will identify the overwhelming majority of pathogenic variants, but some are not detectable with current technology.   Ms. Moreno may carry a pathogenic variant in another gene associated with hereditary cancer predisposition that was not tested for, or the risk in the family may be due to other genetic factors that are not well understood.    ASSESSMENT AND PLAN  Ms. Moreno meets NCCN guidelines for genetic testing.  We reviewed the options for genetic testing including a multi-gene panel of high risk genes, a panel of genes with known management guidelines, or a broader approach including more newly discovered genes.  We reviewed the benefits and drawbacks of this approach, including finding mutations in genes that are less well understood, or results that are unexpected based on this family history.  Ms. oMreno elected to pursue genetic testing.  [facility]'s [facility]Next - Cancer panel was ordered analyzing 81 genes associated with an increased cancer and pancreatitis risk. The genes on this panel include AIP, ALK, APC, DUNIA, AXIN2, BAP1, BARD1, BMPR1A, BRCA1, BRCA2, BRIP1, CDC73, CDH1, CDK4, CDKN1B, CDKN2A, CEBPA,  CHEK2, CFTR, CPA1, CTNNA1, CTRC, DDX41, DICER1, EGFR, EPCAM, ETV6, FH, FLCN, GATA2, GREM1, HOXB13, KIT, LZTR1, MAX, MBD4, MEN1, MET, MITF, MLH1, MSH2, MSH3, MSH6, MUTYH, NF1, NF2, NTHL1, PALB2, PDGFRA, PHOX2B, PMS2, POLD1, POLE, POT1, XAFTP8E, PRSS1, PTCH1, PTEN, RAD51C, RAD51D, RB1, RET, RUNX1, SDHA, SDHAF2, SDHB, SDHC, SDHD, SMAD4, SMARCA4, SMARCB1, SMARCE1, SPINK1, STK11, SUFU, UMNF503, TP53, TSC1, TSC2, VHL, and WT1.  A blood sample for genetic evaluation was collected at [facility].  Genetic testing results are expected in 2 - 4 weeks from the day the sample is received by [facility] lab.  We will contact the patient when results are received.  Ms. Moreno asked excellent questions during the consult and did not demonstrate any acute psychosocial distress during our visit. This clinic encounter was 30 minutes.      Seamus Martinez MS, [facility]  Genetic Counselor  [facility]

## 2025-05-02 ENCOUNTER — TELEPHONE (OUTPATIENT)
Dept: GENETICS | Facility: HOSPITAL | Age: 40
End: 2025-05-02
Payer: COMMERCIAL

## 2025-05-05 ENCOUNTER — DOCUMENTATION (OUTPATIENT)
Dept: GENETICS | Facility: HOSPITAL | Age: 40
End: 2025-05-05
Payer: COMMERCIAL

## 2025-05-05 NOTE — PROGRESS NOTES
Date of Visit: 2025  Name: Temitope Moreno  : 1985  MRN: [id number]    Referring Provider: Angy Forde MD      REASON FOR VISIT  Temitope Moreno is a 39 y.o. female referred for genetic counseling due to a family history of pancreatic cancer.  Ms. Moreno does not have a personal history of cancer.  She reached menarche around age 12 and had her first child at age 29.  She is premenopausal and has never used HRT.  She has never undergone mammogram or colonoscopy screenings.  She retains her ovaries and uterus.  Ms. Moreno elected to pursue genetic testing via [facility]-Cancer panel with [facility] analyzing 81-cancer and chronic pancreatitis risk genes.  Ms. Moreno's genetic test was negative for pathogenic mutations in all 81 genes analyzed.  Ms. 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  Per the American College of Medical Genetics, (ACMG) this VUS is not to be used in clinical decision-making.  Her estimated lifetime breast cancer risk is 12.1% as calculated by the Tyrer-Cuzick model.  Ms. Moreno was notified of these results via telephone on 2025.  A copy of her family history of cancer, Tyrer-Cuzick report, and genetic test are attached to this note.     PERTINENT FAMILY HISTORY:  A cancer-focused four-generation pedigree was obtained via patient reporting     Mother - pancreatic cancer, 53  Maternal Grandfather - multiple melanomas x 7 or 8  Maternal 1st Cousin Once-Removed (cousin of MGF) - chronic pancreatitis  Paternal Great Grandmother - bilateral breast cancer, 75    GENETIC TESTING RESULTS AND RECOMMENDATIONS  Genetic testing was performed by [facility] analyzing 81 cancer-risk and chronic pancreatitis genes.  No pathogenic mutations were detected by sequencing, rearrangement testing, and RNA analysis for the 81 - genes analyzed.  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  With multigene panel testing, it is not uncommon for a variant of uncertain significance (VUS) to be identified.  Variants are termed VUS when there is not sufficient evidence to classify the variant as a negative (not harmful) variant or a positive (harmful) mutation. Genetic testing labs work to reclassify all VUSs overtime and issue an updated report when a reclassification occurs.  The majority (over 90%) of VUSs that are reclassified are found to be negative genetic variants, however, a small percentage (~9%) will reclassified as a harmful mutation. Clinically, a VUS is treated as a negative result.  Medical care should not be altered solely based on a VUS result.  Medical care should be guided by a family history-based risk assessment.  Genetic testing for other unaffected (never had cancer) relatives is not recommended solely for a VUS.     This result does not clarify the cause of Ms. Moreno's family history of cancer, nor does it absolutely rule out a hereditary cause either.  There could be a mutation in the family that explains the pattern of cancer that Ms. Moreno did not inherit.  There could be a mutation in the family that explains the pattern of cancer that we cannot identify at this time or in a gene that was not tested.  There could be a mutation in one of the genes tested that was not detected. Current genetic testing technologies will identify the majority of mutations with high accuracy, but some remain undetectable at this time.  Ms. Childresss family history of cancer could be occurring not due to a single gene mutation, but due to multiple undetectable genetic and environmental factors.  There is a chance this VUS could be upgraded in the future to a pathogenic mutation.  We encourage patients to reach out over time to inquire about any new methodologies of testing or newly identified cancer risk genes that could explain their personal or family history of cancer.    The  Tyrer-Cuzick model (DEEPA) is a validated female breast cancer risk tool that assesses an unaffected (has not had breast cancer) female's lifetime breast cancer risk.  Tyrer-Cuzick version 8.0b estimates Ms. Moreno's lifetime breast cancer risk is 12.1%.  This model predicts the average 39 y.o. unaffected female's lifetime risk of breast cancer to be 10.9%.  The National Comprehensive Cancer Network guidelines (version 2.2025) recommend the following for 39 y.o. females who have an less than a 20% lifetime risk of breast cancer calculated from validated models such as the Tyrer-Cuzick model:    Annual clinical breast exam  Annual mammogram with tomosynthesis starting at age 40  Practice breast awareness    GENETIC COUNSELING  We reviewed the family history and her personal history information in detail. Cases of cancer follow three general patterns: sporadic, familial, and hereditary.  While most cancer cases are sporadic (~70% of cancer cases), some cases appear to occur in family clusters.  These cases are said to be familial and account for around 20% of cancer cases.  Familial cases may be due to a combination of shared genes and environmental factors among family members that we cannot evaluate via genetic testing at this time.  In 5% - 10% of cancer cases, the risk for cancer is inherited, and the genes responsible for the increased cancer risk are known.       Family histories typical of hereditary cancer syndromes usually include multiple first- and second-degree relatives diagnosed with cancer types that define a syndrome.  These cases tend to be diagnosed at younger-than-expected ages and can be bilateral or multifocal.  The cancer in these families follows an autosomal dominant inheritance pattern, which indicates the likely presence of a mutation in a cancer gene.  Children and siblings of an individual carrying a mutation have a 50% chance of inheriting that mutation, thereby inheriting the increased  "risk to develop cancer.  However, it is not recommended to pursue genetic testing for adult-onset cancers in children as the results would not have clinical utility until some time in adulthood among other ethical reasons.  These mutations can be passed down from the maternal or the paternal lineage.     We discussed that risk factors or \"red flags\" for hereditary risk include cancers diagnosed at earlier-than-average ages, multiple family members diagnosed with cancers associated with mutations in the same gene, or multiple generations of associated cancers. Dependent on the specific cancer type or syndrome, there exists the potential for several clinically significant genes related to the cancer's onset or increased risk for development.  Therefore, the standard approach to hereditary cancer genetic testing at this time is via multi-gene panel analyzing several genes associated with a hereditary risk for cancer.  We reviewed the results in the context of the patient's personal and family history to determine what, if any, post-test cancer risk management changes are recommended.      GENETIC TESTING  The risks, benefits, and limitations of genetic testing and implications for clinical management following testing were reviewed.  Genetic test results can influence decisions regarding screening and prevention.  Genetic testing can have significant psychological implications for both individuals and families. Also discussed was the possibility of insurance discrimination based on genetic test results and the laws in place to prevent this, as well as the limitations of these laws.       The Genetic Information Nondiscrimination Act (NORY) is a federal law enacted in May 2008.  NORY prohibits discrimination on the basis of genetic information such as genetic test results with respect to health insurance and employment status.  Under Title 1 of NORY, health insurance companies are prohibited from using an individual's " genetic information to change premiums, drop or change an individual's coverage, or prevent coverage from being acquired.  Title 2 of NORY prohibits employers from using genetic information in employment decisions such as, but not limited to, hiring, firing, promotions, pay, and job assignments.  NORY protections do not protect against genetic discrimination by life insurance, long-term care or disability insurance,  service members, federal employees, those using the  Health Service, or those employed by a small business with fewer than 15 employees.     FAMILY CONSIDERATIONS  Genetic testing for Ms. Moreno's relatives may be informative despite Ms. Moreno's test result being regarded a negative result.  Her mother's pancreatic cancer diagnosis is suggestive of a hereditary cancer-causing mutation possibly existing in the family that Ms. Moreno did not inherit.  Testing other unaffected family members may result in negative results or other VUS's that would not clarify her family history of cancer.  Ms. Moreno cannot pass on mutations that she does not have.  Genetic testing for family members solely based on the discovery of a VUS is not recommended.  However, if the MUTYH VUS she carries is upgraded to a pathogenic mutation in the future then these recommendations could change.     Family members are encouraged to discuss their family history of cancer and appropriate cancer screening recommendations with their healthcare providers.  Family members are also encouraged to inquire about information regarding genetics and genetic testing, if appropriate, at a clinic that offers genetic counseling or their healthcare provider.  We would be happy to see relatives in our clinic for genetic counseling and testing.  They can request a referral from their healthcare provider to [facility] and that will prompt a coordinator to call them for an appointment.   For family members who  live elsewhere, there are genetic counselors at most [facility] centers.  They can find a genetic counselor by visiting the National Society of Genetic Counselors website at www.nsgc.org or they can call our office and we would be happy to give them the contact information of the closest genetic counselor.     SUMMARY  Ms. Moreno's hereditary cancer genetic test identified no pathogenic mutations increasing her cancer risk, nor explaining her family history of cancer.  Ms. Moreno's genetic test result did find a VUS in the MUTYH gene and is not to be used in clinical decision-making per the American College of Medical Genetics (ACMG) guidelines.  Her estimated lifetime breast cancer risk is 12.1%.  It is recommended that Ms. Moreno follow her provider's recommendations for future cancer screening and future risk reduction.       Seamus Martinez MS, [facility]  Genetic Counselor  [facility]    Cc:  Angy East MD